# Patient Record
Sex: FEMALE | Race: WHITE | NOT HISPANIC OR LATINO | Employment: PART TIME | URBAN - METROPOLITAN AREA
[De-identification: names, ages, dates, MRNs, and addresses within clinical notes are randomized per-mention and may not be internally consistent; named-entity substitution may affect disease eponyms.]

---

## 2017-02-28 ENCOUNTER — HOSPITAL ENCOUNTER (EMERGENCY)
Facility: HOSPITAL | Age: 32
Discharge: HOME | End: 2017-02-28
Attending: EMERGENCY MEDICINE
Payer: COMMERCIAL

## 2017-02-28 VITALS
OXYGEN SATURATION: 100 % | RESPIRATION RATE: 16 BRPM | HEART RATE: 82 BPM | DIASTOLIC BLOOD PRESSURE: 101 MMHG | SYSTOLIC BLOOD PRESSURE: 159 MMHG | TEMPERATURE: 98.3 F | WEIGHT: 215 LBS

## 2017-02-28 DIAGNOSIS — Z51.89 VISIT FOR WOUND CHECK: Primary | ICD-10-CM

## 2017-02-28 DIAGNOSIS — T14.8XXA BLEEDING FROM WOUND: ICD-10-CM

## 2017-02-28 LAB
ANION GAP SERPL CALCULATED.3IONS-SCNC: 9 MMOL/L (ref 4–13)
BASOPHILS # BLD AUTO: 0 THOUSANDS/ΜL (ref 0–0.1)
BASOPHILS NFR BLD AUTO: 1 % (ref 0–1)
BUN SERPL-MCNC: 10 MG/DL (ref 5–25)
CALCIUM SERPL-MCNC: 8.8 MG/DL (ref 8.3–10.1)
CHLORIDE SERPL-SCNC: 105 MMOL/L (ref 100–108)
CO2 SERPL-SCNC: 27 MMOL/L (ref 21–32)
CREAT SERPL-MCNC: 0.85 MG/DL (ref 0.6–1.3)
EOSINOPHIL # BLD AUTO: 0.1 THOUSAND/ΜL (ref 0–0.61)
EOSINOPHIL NFR BLD AUTO: 1 % (ref 0–6)
ERYTHROCYTE [DISTWIDTH] IN BLOOD BY AUTOMATED COUNT: 16.3 % (ref 11.6–15.1)
GFR SERPL CREATININE-BSD FRML MDRD: >60 ML/MIN/1.73SQ M
GLUCOSE SERPL-MCNC: 90 MG/DL (ref 65–140)
HCT VFR BLD AUTO: 36.9 % (ref 37–47)
HGB BLD-MCNC: 12 G/DL (ref 12–16)
LYMPHOCYTES # BLD AUTO: 1.3 THOUSANDS/ΜL (ref 0.6–4.47)
LYMPHOCYTES NFR BLD AUTO: 20 % (ref 14–44)
MCH RBC QN AUTO: 26.8 PG (ref 27–31)
MCHC RBC AUTO-ENTMCNC: 32.5 G/DL (ref 31.4–37.4)
MCV RBC AUTO: 82 FL (ref 82–98)
MONOCYTES # BLD AUTO: 0.5 THOUSAND/ΜL (ref 0.17–1.22)
MONOCYTES NFR BLD AUTO: 8 % (ref 4–12)
NEUTROPHILS # BLD AUTO: 4.6 THOUSANDS/ΜL (ref 1.85–7.62)
NEUTS SEG NFR BLD AUTO: 70 % (ref 43–75)
NRBC BLD AUTO-RTO: 0 /100 WBCS
PLATELET # BLD AUTO: 448 THOUSANDS/UL (ref 130–400)
PMV BLD AUTO: 7.9 FL (ref 8.9–12.7)
POTASSIUM SERPL-SCNC: 4 MMOL/L (ref 3.5–5.3)
RBC # BLD AUTO: 4.48 MILLION/UL (ref 4.2–5.4)
SODIUM SERPL-SCNC: 141 MMOL/L (ref 136–145)
WBC # BLD AUTO: 6.5 THOUSAND/UL (ref 4.8–10.8)

## 2017-02-28 PROCEDURE — 85025 COMPLETE CBC W/AUTO DIFF WBC: CPT | Performed by: EMERGENCY MEDICINE

## 2017-02-28 PROCEDURE — 99283 EMERGENCY DEPT VISIT LOW MDM: CPT

## 2017-02-28 PROCEDURE — 80048 BASIC METABOLIC PNL TOTAL CA: CPT | Performed by: EMERGENCY MEDICINE

## 2017-02-28 PROCEDURE — 36415 COLL VENOUS BLD VENIPUNCTURE: CPT | Performed by: EMERGENCY MEDICINE

## 2017-02-28 RX ORDER — NIFEDIPINE 90 MG/1
90 TABLET, FILM COATED, EXTENDED RELEASE ORAL 2 TIMES DAILY
COMMUNITY
End: 2017-11-24 | Stop reason: ALTCHOICE

## 2017-02-28 RX ORDER — LABETALOL 200 MG/1
200 TABLET, FILM COATED ORAL 2 TIMES DAILY
COMMUNITY
End: 2017-11-24 | Stop reason: ALTCHOICE

## 2017-02-28 RX ORDER — LAMOTRIGINE 100 MG/1
300 TABLET ORAL
COMMUNITY
End: 2021-03-18 | Stop reason: ALTCHOICE

## 2017-02-28 RX ORDER — FOLIC ACID 1 MG/1
2 TABLET ORAL 2 TIMES DAILY
COMMUNITY
End: 2021-03-18 | Stop reason: ALTCHOICE

## 2017-11-24 ENCOUNTER — APPOINTMENT (EMERGENCY)
Dept: RADIOLOGY | Facility: HOSPITAL | Age: 32
End: 2017-11-24
Payer: COMMERCIAL

## 2017-11-24 ENCOUNTER — HOSPITAL ENCOUNTER (EMERGENCY)
Facility: HOSPITAL | Age: 32
Discharge: HOME | End: 2017-11-24
Admitting: EMERGENCY MEDICINE
Payer: COMMERCIAL

## 2017-11-24 VITALS
BODY MASS INDEX: 38.32 KG/M2 | TEMPERATURE: 97.1 F | DIASTOLIC BLOOD PRESSURE: 93 MMHG | RESPIRATION RATE: 16 BRPM | OXYGEN SATURATION: 99 % | HEART RATE: 91 BPM | SYSTOLIC BLOOD PRESSURE: 141 MMHG | WEIGHT: 230 LBS | HEIGHT: 65 IN

## 2017-11-24 DIAGNOSIS — S93.602A SPRAIN OF LEFT FOOT, INITIAL ENCOUNTER: Primary | ICD-10-CM

## 2017-11-24 PROCEDURE — 99283 EMERGENCY DEPT VISIT LOW MDM: CPT

## 2017-11-24 PROCEDURE — 73630 X-RAY EXAM OF FOOT: CPT

## 2017-11-24 RX ORDER — NAPROXEN 500 MG/1
500 TABLET ORAL 2 TIMES DAILY WITH MEALS
Qty: 20 TABLET | Refills: 0 | Status: SHIPPED | OUTPATIENT
Start: 2017-11-24 | End: 2019-03-08

## 2017-11-24 RX ORDER — LAMOTRIGINE 200 MG/1
200 TABLET ORAL
COMMUNITY
End: 2021-03-18 | Stop reason: ALTCHOICE

## 2017-11-24 NOTE — ED PROVIDER NOTES
History  Chief Complaint   Patient presents with    Foot Pain     Pt sts fell down steps yesterday and injured left foot  Pain near 5th MTP     33 y/o female presenting with left foot pain and swelling since last night that began after she had fallen down some stairs landing onto her left side  Did not hit her head nor lose consciousness  Was able to ambulate however with some pain  Has not been taking any pain medications  Pain ranking 7 as 10 nonradiating  Notes some swelling bruising to the lateral aspect of her foot  No other joint pain  Denies numbness, paresthesias, open injury  Prior to Admission Medications   Prescriptions Last Dose Informant Patient Reported? Taking?   folic acid (FOLVITE) 1 mg tablet   Yes No   Sig: Take 2 mg by mouth 2 (two) times a day   lamoTRIgine (LaMICtal) 100 mg tablet   Yes No   Sig: Take 300 mg by mouth daily in the early morning     lamoTRIgine (LaMICtal) 200 MG tablet   Yes Yes   Sig: Take 25 mg by mouth daily at bedtime      Facility-Administered Medications: None       Past Medical History:   Diagnosis Date    Hypertension     Seizures ([facility] [address] )        Past Surgical History:   Procedure Laterality Date    ABDOMINAL SURGERY      GASTRIC BYPASS         History reviewed  No pertinent family history  I have reviewed and agree with the history as documented  Social History   Substance Use Topics    Smoking status: Never Smoker    Smokeless tobacco: Never Used    Alcohol use Yes      Comment: occasional        Review of Systems   Constitutional: Negative  Negative for activity change and appetite change  HENT: Negative  Eyes: Negative  Respiratory: Negative  Cardiovascular: Negative  Gastrointestinal: Negative  Genitourinary: Negative  Musculoskeletal: Positive for arthralgias and joint swelling  Negative for back pain, gait problem, myalgias, neck pain and neck stiffness  Skin: Positive for color change   Negative for pallor, rash and wound    Neurological: Negative  Negative for tremors, weakness and numbness  All other systems reviewed and are negative  Physical Exam  ED Triage Vitals [11/24/17 0958]   Temperature Pulse Respirations Blood Pressure SpO2   (!) 97 1 °F (36 2 °C) 91 16 141/93 99 %      Temp src Heart Rate Source Patient Position - Orthostatic VS BP Location FiO2 (%)   -- -- -- -- --      Pain Score       3           Orthostatic Vital Signs  Vitals:    11/24/17 0958   BP: 141/93   Pulse: 91       Physical Exam   Constitutional: She is oriented to person, place, and time  She appears well-developed and well-nourished  Cardiovascular: Normal rate, regular rhythm, normal heart sounds and intact distal pulses  Pulmonary/Chest: Effort normal and breath sounds normal    S PO2 is 99% indicating adequate oxygenation  Musculoskeletal:        Feet:    Neurological: She is alert and oriented to person, place, and time  Skin: Skin is warm and dry  Capillary refill takes less than 2 seconds  Nursing note and vitals reviewed  ED Medications  Medications - No data to display    Diagnostic Studies  Results Reviewed     None                 XR foot 3+ views LEFT   Final Result by Sulma Carcamo MD (11/24 1049)      No acute osseous abnormality  Soft tissue swelling  Workstation performed: [id number]                    Procedures  Procedures       Phone Contacts  ED Phone Contact    ED Course  ED Course                                MDM  Number of Diagnoses or Management Options  Sprain of left foot, initial encounter:   Diagnosis management comments: Discussed with patient results of the x-rays which not show any acute osseous abnormality  Patient was placed in an Ace wrap assessed by me with good neurovascular exam before and after  She has crutches at and I encouraged her to use these  Will have her follow up with Ortho should symptoms persist return for worsening    Patient verbalizes understanding and agrees with the above assessment and plan  Amount and/or Complexity of Data Reviewed  Tests in the radiology section of CPT®: reviewed and ordered  Review and summarize past medical records: yes  Independent visualization of images, tracings, or specimens: yes      CritCare Time    Disposition  Final diagnoses:   Sprain of left foot, initial encounter     Time reflects when diagnosis was documented in both MDM as applicable and the Disposition within this note     Time User Action Codes Description Comment    11/24/2017 11:12 AM Dhruv Cervantes Add [D63 526H] Sprain of left foot, initial encounter       ED Disposition     ED Disposition Condition Comment    Discharge  Hector Bland discharge to home/self care  Condition at discharge: Good        Follow-up Information     Follow up With Specialties Details Why Contact Info Additional [address] Emergency Department Emergency Medicine Go to If symptoms worsen [address] [facility]  [phone] [facility] ED, [facility] [place], [place], [address] , DO Orthopedic Surgery Schedule an appointment as soon as possible for a visit As needed if symptoms persist  [address] [place] [address]  [phone]           Patient's Medications   Discharge Prescriptions    NAPROXEN (NAPROSYN) 500 MG TABLET    Take 1 tablet by mouth 2 (two) times a day with meals for 10 days       Start Date: 11/24/2017End Date: 12/4/2017       Order Dose: 500 mg       Quantity: 20 tablet    Refills: 0     No discharge procedures on file      ED Provider  Electronically Signed by           Susie Aguilar PA-C  11/24/17 9759

## 2017-11-24 NOTE — DISCHARGE INSTRUCTIONS
Foot Sprain   WHAT YOU NEED TO KNOW:   A foot sprain is caused by a stretched or torn ligament in the foot or toe  Ligaments are tough tissues that connect bones  DISCHARGE INSTRUCTIONS:   Seek care immediately if:   · You have numbness or tingling below the injury, such as in your toes  · The skin on your injured foot is blue or pale  · You have increased pain, even after you take pain medicine  Contact your healthcare provider if:   · You have new weakness in your foot  · You have new or increased swelling in your foot  · You have new or increased stiffness when you move your injured foot  · You have questions or concerns about your condition or care  Medicines:   · NSAIDs , such as ibuprofen, help decrease swelling, pain, and fever  This medicine is available with or without a doctor's order  NSAIDs can cause stomach bleeding or kidney problems in certain people  If you take blood thinner medicine, always ask if NSAIDs are safe for you  Always read the medicine label and follow directions  Do not give these medicines to children under 10months of age without direction from your child's healthcare provider  · Take your medicine as directed  Contact your healthcare provider if you think your medicine is not helping or if you have side effects  Tell him of her if you are allergic to any medicine  Keep a list of the medicines, vitamins, and herbs you take  Include the amounts, and when and why you take them  Bring the list or the pill bottles to follow-up visits  Carry your medicine list with you in case of an emergency  Self-care:   · Rest your foot  Limit movement in your sprained foot for the first 2 to 3 days  You might need crutches to take weight off your injured foot as it heals  Use crutches as directed  · Apply ice  on your foot for 15 to 20 minutes every hour or as directed  Use an ice pack, or put crushed ice in a plastic bag  Cover it with a towel   Ice helps prevent tissue damage and decreases swelling and pain  · Compress your foot  You may need to use tape or an elastic bandage to support your foot if you have a mild sprain  You may need a splint on your foot for support if your sprain is severe  Wear your splint for as many days as directed  · Elevate your foot  above the level of your heart as often as you can  This will help decrease swelling and pain  Prop your foot on pillows or blankets to keep it elevated comfortably  Exercise your foot:  You may be given exercises to improve your strength and to help decrease stiffness  The exercises and physical therapy can help restore strength and increase the range of motion in your foot  Ask your healthcare provider when you can return to your normal activities or play sports  Prevent another foot sprain:   · Warm up and stretch before you exercise  · Do not exercise when you feel pain or are tired  · Wear equipment to protect yourself when you play sports  Follow up with your healthcare provider as directed:  Write down your questions so you remember to ask them during your visits  © 2017 [facility] Information is for End User's use only and may not be sold, redistributed or otherwise used for commercial purposes  All illustrations and images included in CareNotes® are the copyrighted property of A D A M , Inc  or [employer]  The above information is an  only  It is not intended as medical advice for individual conditions or treatments  Talk to your doctor, nurse or pharmacist before following any medical regimen to see if it is safe and effective for you

## 2019-03-08 ENCOUNTER — APPOINTMENT (EMERGENCY)
Dept: RADIOLOGY | Facility: HOSPITAL | Age: 34
End: 2019-03-08
Payer: COMMERCIAL

## 2019-03-08 ENCOUNTER — HOSPITAL ENCOUNTER (EMERGENCY)
Facility: HOSPITAL | Age: 34
Discharge: HOME | End: 2019-03-08
Attending: EMERGENCY MEDICINE | Admitting: EMERGENCY MEDICINE
Payer: COMMERCIAL

## 2019-03-08 VITALS
RESPIRATION RATE: 18 BRPM | WEIGHT: 245 LBS | BODY MASS INDEX: 40.82 KG/M2 | DIASTOLIC BLOOD PRESSURE: 80 MMHG | HEIGHT: 65 IN | TEMPERATURE: 97.2 F | HEART RATE: 90 BPM | SYSTOLIC BLOOD PRESSURE: 135 MMHG | OXYGEN SATURATION: 99 %

## 2019-03-08 DIAGNOSIS — S00.33XA CONTUSION OF NOSE, INITIAL ENCOUNTER: Primary | ICD-10-CM

## 2019-03-08 PROCEDURE — 99284 EMERGENCY DEPT VISIT MOD MDM: CPT

## 2019-03-08 PROCEDURE — 70486 CT MAXILLOFACIAL W/O DYE: CPT

## 2019-03-08 RX ORDER — ACETAMINOPHEN 325 MG/1
650 TABLET ORAL ONCE
Status: COMPLETED | OUTPATIENT
Start: 2019-03-08 | End: 2019-03-08

## 2019-03-08 RX ADMIN — ACETAMINOPHEN 650 MG: 325 TABLET, FILM COATED ORAL at 20:41

## 2019-03-09 NOTE — ED PROVIDER NOTES
History  Chief Complaint   Patient presents with    Nasal Injury     Pt reported that her 2 yr boy bumped her nose with his head  Pt heard "snap" from her nose, it was bleeding a liitle at home  Denies any LOC  co headche after the impact  29-year-old female presents to the ER for evaluation of nose injury  About an hour prior to arrival patient's 3year-old softball head butted her nose and she has some mild bleeding afterwards  Patient felt a popping sensation upon impact  Patient currently has moderate pain to bilateral nares  Bleeding stopped  Patient came for evaluation of nasal bone fracture  Patient did not take anything for pain  History provided by:  Parent      Prior to Admission Medications   Prescriptions Last Dose Informant Patient Reported? Taking?   folic acid (FOLVITE) 1 mg tablet Not Taking at Unknown time  Yes No   Sig: Take 2 mg by mouth 2 (two) times a day   lamoTRIgine (LaMICtal) 100 mg tablet 3/8/2019 at Unknown time  Yes Yes   Sig: Take 300 mg by mouth daily in the early morning     lamoTRIgine (LaMICtal) 200 MG tablet  Self Yes No   Sig: Take 200 mg by mouth daily at bedtime       Facility-Administered Medications: None       Past Medical History:   Diagnosis Date    Seizures ([facility] [address] )        Past Surgical History:   Procedure Laterality Date    ABDOMINAL SURGERY      GASTRIC BYPASS         No family history on file  I have reviewed and agree with the history as documented  Social History     Tobacco Use    Smoking status: Never Smoker    Smokeless tobacco: Never Used   Substance Use Topics    Alcohol use: Not Currently    Drug use: No        Review of Systems   Constitutional: Negative for activity change, appetite change, chills and fever  HENT: Negative for congestion and ear pain  Nose pain   Eyes: Negative for pain and discharge  Respiratory: Negative for cough, chest tightness, shortness of breath, wheezing and stridor      Cardiovascular: Negative for chest pain and palpitations  Gastrointestinal: Negative for abdominal distention, abdominal pain, constipation, diarrhea and nausea  Endocrine: Negative for cold intolerance  Genitourinary: Negative for dysuria, frequency and urgency  Musculoskeletal: Negative for arthralgias and back pain  Skin: Negative for color change and rash  Allergic/Immunologic: Negative for environmental allergies and food allergies  Neurological: Negative for dizziness, weakness, numbness and headaches  Hematological: Negative for adenopathy  Psychiatric/Behavioral: Negative for agitation, behavioral problems and confusion  The patient is not nervous/anxious  All other systems reviewed and are negative  Physical Exam  Physical Exam   Constitutional: She is oriented to person, place, and time  She appears well-developed and well-nourished  HENT:   Head: Normocephalic and atraumatic  Mouth/Throat: Oropharynx is clear and moist    Tenderness to palpation noted over nasal bridge  No other obvious deformities noted  No blood noted to bilateral nasal canals  No septal deviation noted  Eyes: Conjunctivae and EOM are normal    Neck: Normal range of motion  Neck supple  Cardiovascular: Normal rate, regular rhythm, normal heart sounds and intact distal pulses  Pulmonary/Chest: Effort normal and breath sounds normal    Abdominal: Soft  Bowel sounds are normal  She exhibits no distension  There is no tenderness  Musculoskeletal: Normal range of motion  Neurological: She is alert and oriented to person, place, and time  Skin: Skin is warm and dry  Psychiatric: She has a normal mood and affect  Her behavior is normal  Judgment and thought content normal    Nursing note and vitals reviewed        Vital Signs  ED Triage Vitals [03/08/19 1954]   Temperature Pulse Respirations Blood Pressure SpO2   (!) 97 2 °F (36 2 °C) 90 18 135/80 99 %      Temp Source Heart Rate Source Patient Position - Orthostatic VS BP Location FiO2 (%)   Tympanic Monitor Sitting Right arm --      Pain Score       5           Vitals:    03/08/19 1954   BP: 135/80   Pulse: 90   Patient Position - Orthostatic VS: Sitting       Visual Acuity      ED Medications  Medications   acetaminophen (TYLENOL) tablet 650 mg (650 mg Oral Given 3/8/19 2041)       Diagnostic Studies  Results Reviewed     None                 CT facial bones without contrast   Final Result by Becca Martinez DO (03/08 2108)      No evidence of acute traumatic injury to the facial bones  Workstation performed: [id number]                    Procedures  Procedures       Phone Contacts  ED Phone Contact    ED Course                               MDM  Number of Diagnoses or Management Options  Contusion of nose, initial encounter: new and requires workup  Diagnosis management comments: Obtain CT facial bone to rule out any acute traumatic injuries  Give Tylenol for pain  Risk of Complications, Morbidity, and/or Mortality  General comments: No acute traumatic injuries noted on CT scan  At this point patient's symptoms are consistent with nasal contusion  Discussed supportive care and p r n  Tylenol for pain  Patient discharged home with follow-up to PCP  Close return instructions given to return to the ER for any worsening symptoms  Patient agrees with discharge plan  Patient well appearing at time of discharge  Patient Progress  Patient progress: stable      Disposition  Final diagnoses:   Contusion of nose, initial encounter     Time reflects when diagnosis was documented in both MDM as applicable and the Disposition within this note     Time User Action Codes Description Comment    3/8/2019  9:47 PM Irean Frankel Add [S00 [id number]] Contusion of nose, initial encounter       ED Disposition     ED Disposition Condition Date/Time Comment    Discharge Stable Fri Mar 8, 2019  9:47 PM Adrian Weber discharge to home/self care              Follow-up Information Follow up With Specialties Details Why Contact Info    Ivan Wilson DO   As needed [address]  [address]  [phone]            Discharge Medication List as of 3/8/2019  9:48 PM      CONTINUE these medications which have NOT CHANGED    Details   !! lamoTRIgine (LaMICtal) 100 mg tablet Take 300 mg by mouth daily in the early morning  , Historical Med      folic acid (FOLVITE) 1 mg tablet Take 2 mg by mouth 2 (two) times a day, Until Discontinued, Historical Med      !! lamoTRIgine (LaMICtal) 200 MG tablet Take 200 mg by mouth daily at bedtime , Historical Med       !! - Potential duplicate medications found  Please discuss with provider  No discharge procedures on file      ED Provider  Electronically Signed by           Sterling Parra DO  03/09/19 0110

## 2020-02-29 ENCOUNTER — OFFICE VISIT (OUTPATIENT)
Dept: URGENT CARE | Facility: CLINIC | Age: 35
End: 2020-02-29
Payer: COMMERCIAL

## 2020-02-29 ENCOUNTER — APPOINTMENT (OUTPATIENT)
Dept: RADIOLOGY | Facility: CLINIC | Age: 35
End: 2020-02-29
Payer: COMMERCIAL

## 2020-02-29 VITALS
DIASTOLIC BLOOD PRESSURE: 82 MMHG | WEIGHT: 250 LBS | TEMPERATURE: 97.6 F | HEART RATE: 87 BPM | SYSTOLIC BLOOD PRESSURE: 143 MMHG | BODY MASS INDEX: 41.65 KG/M2 | OXYGEN SATURATION: 100 % | HEIGHT: 65 IN | RESPIRATION RATE: 16 BRPM

## 2020-02-29 DIAGNOSIS — M79.671 RIGHT FOOT PAIN: Primary | ICD-10-CM

## 2020-02-29 PROCEDURE — 73630 X-RAY EXAM OF FOOT: CPT

## 2020-02-29 PROCEDURE — 99213 OFFICE O/P EST LOW 20 MIN: CPT | Performed by: PHYSICIAN ASSISTANT

## 2020-02-29 RX ORDER — LEVOTHYROXINE SODIUM 0.05 MG/1
50 TABLET ORAL DAILY
COMMUNITY
Start: 2020-01-26 | End: 2021-03-18 | Stop reason: ALTCHOICE

## 2020-02-29 RX ORDER — PNV,CALCIUM 72/IRON/FOLIC ACID 27 MG-1 MG
1 TABLET ORAL DAILY
COMMUNITY
Start: 2020-02-17 | End: 2021-03-18 | Stop reason: ALTCHOICE

## 2020-02-29 NOTE — PROGRESS NOTES
[facility] [facility]        NAME: Mirtha Love is a 29 y o  female  : 1985    MRN: [id number]  DATE: 2020  TIME: 12:20 PM    Assessment and Plan   Right foot pain [M79 671]  1  Right foot pain  XR foot 3+ vw right         Patient Instructions   The patient is currently in her 2nd trimester pregnancy  Risks and benefits of performing an x-ray were extensively discussed with the patient  She is agreeable to an x-ray and understands the above  X-ray right foot performed in office-no fractures visualized  Official radiology report pending at this time  Our office will call if there are positive findings  Needed pain  Rest ice elevate  Follow up with an orthopedic physician if pain persists  Proceed to  ER if symptoms worsen  Chief Complaint     Chief Complaint   Patient presents with    Foot Pain     Right foot pain due to injury occurred approx 3 weeks ago         History of Present Illness   The patient is a 60-year-old female right pain has been approximately 3 weeks ago  She is currently in her 2nd trimester pregnancy  She states that she dropped a cellphone  weighing approximately 5 lb on right foot  She has had positive tenderness above the right 3rd toe with no improvement  She has been taking Tylenol pain  There is no bruising or swelling  Negative wound or abrasion  HPI    Review of Systems   Review of Systems   Constitutional: Negative for fever  Musculoskeletal:        Right foot pain  All other systems reviewed and are negative          Current Medications       Current Outpatient Medications:     folic acid (FOLVITE) 1 mg tablet, Take 2 mg by mouth 2 (two) times a day, Disp: , Rfl:     lamoTRIgine (LaMICtal) 100 mg tablet, Take 300 mg by mouth daily in the early morning  , Disp: , Rfl:     lamoTRIgine (LaMICtal) 200 MG tablet, Take 200 mg by mouth daily at bedtime , Disp: , Rfl:     levothyroxine 50 mcg tablet, Take 50 mcg by mouth daily, Disp: , Rfl:     Prenatal Vit-Fe Fumarate-FA (PREPLUS) 27-1 MG TABS, Take 1 tablet by mouth daily, Disp: , Rfl:     Current Allergies     Allergies as of 02/29/2020    (No Known Allergies)            The following portions of the patient's history were reviewed and updated as appropriate: allergies, current medications, past family history, past medical history, past social history, past surgical history and problem list      Past Medical History:   Diagnosis Date    Seizures ([facility] [address] )        Past Surgical History:   Procedure Laterality Date    ABDOMINAL SURGERY      GASTRIC BYPASS         History reviewed  No pertinent family history  Medications have been verified  Objective   /82   Pulse 87   Temp 97 6 °F (36 4 °C)   Resp 16   Ht 5' 5" (1 651 m)   Wt 113 kg (250 lb)   LMP 03/01/2019   SpO2 100%   BMI 41 60 kg/m²        Physical Exam     Physical Exam   Constitutional: She appears well-developed and well-nourished  No distress  HENT:   Head: Normocephalic and atraumatic  Pulmonary/Chest: Effort normal  No respiratory distress  Musculoskeletal: Normal range of motion  She exhibits no edema or deformity  Right foot: There is tenderness and bony tenderness  There is normal range of motion, no swelling, normal capillary refill, no crepitus, no deformity and no laceration  Feet:    Skin: Skin is warm and dry  Capillary refill takes less than 2 seconds  No rash noted  She is not diaphoretic  No erythema  No pallor  Psychiatric: She has a normal mood and affect  Her behavior is normal    Nursing note and vitals reviewed

## 2020-02-29 NOTE — PATIENT INSTRUCTIONS
The patient is currently in her 2nd trimester pregnancy  Risks and benefits of performing an x-ray were extensively discussed with the patient  She is agreeable to an x-ray and understands the above  X-ray right foot performed in office-no fractures visualized  Official radiology report pending at this time  Our office will call if there are positive findings  Needed pain  Rest ice elevate  Follow up with an orthopedic physician if pain persists  Proceed to  ER if symptoms worsen

## 2020-05-12 LAB
EXTERNAL HIV CONFIRMATION: NORMAL
EXTERNAL HIV SCREEN: NORMAL

## 2021-03-10 DIAGNOSIS — Z23 ENCOUNTER FOR IMMUNIZATION: ICD-10-CM

## 2021-03-18 ENCOUNTER — OFFICE VISIT (OUTPATIENT)
Dept: URGENT CARE | Facility: CLINIC | Age: 36
End: 2021-03-18
Payer: COMMERCIAL

## 2021-03-18 VITALS
RESPIRATION RATE: 16 BRPM | BODY MASS INDEX: 43.27 KG/M2 | HEART RATE: 100 BPM | OXYGEN SATURATION: 98 % | TEMPERATURE: 96.5 F | WEIGHT: 260 LBS

## 2021-03-18 DIAGNOSIS — S46.811A STRAIN OF RIGHT TRAPEZIUS MUSCLE, INITIAL ENCOUNTER: Primary | ICD-10-CM

## 2021-03-18 PROCEDURE — 99213 OFFICE O/P EST LOW 20 MIN: CPT | Performed by: PHYSICIAN ASSISTANT

## 2021-03-18 RX ORDER — LAMOTRIGINE 100 MG/1
100 TABLET ORAL DAILY
COMMUNITY

## 2021-03-18 RX ORDER — LANOLIN ALCOHOL/MO/W.PET/CERES
1 CREAM (GRAM) TOPICAL
COMMUNITY

## 2021-03-18 RX ORDER — METAXALONE 800 MG/1
800 TABLET ORAL 3 TIMES DAILY
Qty: 20 TABLET | Refills: 0 | Status: SHIPPED | OUTPATIENT
Start: 2021-03-18

## 2021-03-18 NOTE — PATIENT INSTRUCTIONS
Take the muscle relaxant as directed  This medication can make you drowsy  Continue ibuprofen to reduce pain and inflammation  Apply heat for 20-30 minutes at a time, as needed for swelling and discomfort  Perform stretching and range-of-motion exercises 2-3 times daily after heat application  Begin physical therapy  Follow up with family doctor in 3-5 days  Proceed to the ER if symptoms worsen

## 2021-03-18 NOTE — PROGRESS NOTES
[facility]        NAME: Ekaterina Lazo is a 28 y o  female  : 1985    MRN: [id number]  DATE: 2021  TIME: 10:14 AM    Assessment and Plan   Strain of right trapezius muscle, initial encounter [S49 446B]  1  Strain of right trapezius muscle, initial encounter  metaxalone (SKELAXIN) 800 mg tablet    Ambulatory referral to Physical Therapy     Patient Instructions     Take the muscle relaxant as directed  This medication can make you drowsy  Continue ibuprofen to reduce pain and inflammation  Apply heat for 20-30 minutes at a time, as needed for swelling and discomfort  Perform stretching and range-of-motion exercises 2-3 times daily after heat application  Begin physical therapy  Follow up with family doctor in 3-5 days  Proceed to the ER if symptoms worsen  Chief Complaint     Chief Complaint   Patient presents with    Pain     right shoulder pain r/t child playing with pt and jumping     History of Present Illness    77-year-old female presenting with complaint of right shoulder pain  Pain has been ongoing for the past week or 2  Cannot recall a specific injury but states that she often sits on the floor to play with her toddler  Notes he often jumps and pulls at her and believes that this may have caused the pain  Reports a tightness and sharp pain over the posterior shoulder that is made worse by movement  Denies any distal numbness, tingling, or weakness  No prior injury  No neck or chest pain  She is treating with ibuprofen with some relief  Review of Systems   Review of Systems   Constitutional: Negative for chills, diaphoresis and fever  Respiratory: Negative for cough, shortness of breath and wheezing  Cardiovascular: Negative for chest pain and palpitations  Musculoskeletal:        As noted in HPI  Skin: Negative for color change  Neurological: Negative for dizziness and headaches       Current Medications       Current Outpatient Medications:    folic acid (FOLVITE) 225 mcg tablet, Take 1 mg by mouth, Disp: , Rfl:     lamoTRIgine (LaMICtal) 100 mg tablet, Take 100 mg by mouth daily 500 mg total/ day, Disp: , Rfl:     metaxalone (SKELAXIN) 800 mg tablet, Take 1 tablet (800 mg total) by mouth 3 (three) times a day, Disp: 20 tablet, Rfl: 0    Current Allergies     Allergies as of 2021    (No Known Allergies)            The following portions of the patient's history were reviewed and updated as appropriate: allergies, current medications, past family history, past medical history, past social history, past surgical history and problem list      Past Medical History:   Diagnosis Date    Seizures ([facility], [place] [address] )        Past Surgical History:   Procedure Laterality Date    ABDOMINAL SURGERY       SECTION      x1    GASTRIC BYPASS         History reviewed  No pertinent family history  Medications have been verified  Objective   Pulse 100   Temp (!) 96 5 °F (35 8 °C)   Resp 16   Wt 118 kg (260 lb)   LMP 2021   SpO2 98%   Breastfeeding No   BMI 43 27 kg/m²   Patient's last menstrual period was 2021  Physical Exam     Physical Exam  Vitals signs and nursing note reviewed  Constitutional:       General: She is not in acute distress  Appearance: She is well-developed  She is not ill-appearing or diaphoretic  HENT:      Head: Normocephalic and atraumatic  Eyes:      General: Lids are normal       Conjunctiva/sclera: Conjunctivae normal    Cardiovascular:      Rate and Rhythm: Normal rate and regular rhythm  Pulmonary:      Effort: Pulmonary effort is normal       Breath sounds: Normal breath sounds  No decreased breath sounds, wheezing, rhonchi or rales  Musculoskeletal:      Right shoulder: She exhibits decreased range of motion, tenderness (over right trapezius) and spasm (right trapezius)  She exhibits no bony tenderness, no swelling, no effusion, no crepitus, no deformity and normal strength  Cervical back: Normal    Skin:     General: Skin is warm and dry  Neurological:      General: No focal deficit present  Mental Status: She is alert  She is not disoriented  Cranial Nerves: Cranial nerves are intact  Coordination: Coordination normal       Gait: Gait normal    Psychiatric:         Behavior: Behavior normal  Behavior is cooperative  Thought Content:  Thought content normal          Judgment: Judgment normal

## 2022-01-24 ENCOUNTER — AMB VIDEO VISIT (OUTPATIENT)
Dept: OTHER | Facility: HOSPITAL | Age: 37
End: 2022-01-24

## 2022-01-24 DIAGNOSIS — U07.1 COVID: Primary | ICD-10-CM

## 2022-01-24 DIAGNOSIS — R42 DIZZINESS: ICD-10-CM

## 2022-01-24 PROCEDURE — ECARE PR SL URGENT CARE VIRTUAL VISIT: Performed by: NURSE PRACTITIONER

## 2022-01-24 NOTE — PATIENT INSTRUCTIONS
At this time, would recommend you monitor symptoms  Rest and hydrate yourself  Gatorade and sports drinks may be helpful  No sudden movements  Offered ER but you declined, as we dicussed if symptoms return or worsen go to ER  Continue to isolate as per CDC guidelines for covid  Go to ER with any worsening symptoms, chest pain, sob, persistent dizziness, dehydration or confusion  In Your Home:  If you live with other people, trying to avoid common spaces and disinfect areas that you come into contact with  Per the CDC's recommendations: persons who suspect that they might have COVID-19 should isolate, stay at home, and use a separate bedroom and bathroom if feasible  Isolation should begin even before seeking testing and before test results become available  All household members should start wearing a mask in the home, particularly in shared spaces where appropriate distancing is not possible  Take Care of Yourself:  Try to sleep on your stomach as much as possible  If you have the ability to, take vitamin D3 2000 IU by mouth daily, vitamin-C 1000 mg by mouth twice a day, a multivitamin daily to help boost your immune system  You should check your temperature twice day  Return to the emergency department for any severe shortness of breath or pulse ox less than 90%  If You Test Positive for COVID-19 (Isolate)     Everyone, regardless of vaccination status:     · Stay home for 5 days  · If you have no symptoms or your symptoms are resolving after 5 days, you can leave your house  · Continue to wear a mask around others for 5 additional days  If you have a fever, continue to stay home until your fever resolves       If You Were Exposed to Someone with COVID-19 (Quarantine)  If you:  Have been boosted  OR  Completed the primary series of Pfizer or Moderna vaccine within the last 6 months  OR  Completed the primary series of J&J vaccine within the last 2 months     · Wear a mask around others for 10 days  · Test on day 5, if possible  If you develop symptoms get a test and stay home  If you:  Completed the primary series of Pfizer or Moderna vaccine over 6 months ago and are not boosted  OR  Completed the primary series of J&J over 2 months ago and are not boosted  OR  Are unvaccinated     · Stay home for 5 days  After that continue to wear a mask around others for 5 additional days  · If you cant quarantine you must wear a mask for 10 days  · Test on day 5 if possible       If you develop symptoms get a test and stay home

## 2022-01-24 NOTE — CARE ANYWHERE EVISITS
Visit Summary for Johnathan Cherry - Gender: Female - Date of Birth: [id number]  Date: [id number] - Duration: 10 minutes  Patient: Johnathan Cherry  Provider: Sarah CISNEROS    Patient Contact Information  Address  [address]; [address]  [phone]    Visit Topics  disoriented/ double vision/covid [Added By: Self - 2022-01-24]    Triage Questions   What is your current physical address in the event of a medical emergency? Answer []  Are you allergic to any medications? Answer []  Are you now or could you be pregnant? Answer []  Do you have any immune system compromise or chronic lung   disease? Answer []  Do you have any vulnerable family members in the home (infant, pregnant, cancer, elderly)? Answer []     Conversation Transcripts  [0A][0A] [Notification] You are connected with Oscar Celaya, Urgent Care Specialist [0A][Notification] Ivana Anguiano is located in [place]  [0A][Notification] Ivana Anguiano has shared health history  Jareth Manifold  [0A]    Diagnosis  COVID-19  Dizziness and giddiness    Procedures  Value: [id number] Code: CPT-4 UNLISTED E&M SERVICE    Medications Prescribed    No prescriptions ordered    Electronically signed by: Oscar Duron(NPI [id number])

## 2022-01-24 NOTE — PROGRESS NOTES
Video Visit - Freddy Alonso 39 y o  female MRN: [id number]    REQUIRED DOCUMENTATION:         1  This service was [facility] via [facility]  2  Provider located at [address] [place] [id number]  3  [facility] provider: BLAYNE Lino  4  Identify all parties in room with patient during [facility] visit:  Patient, , kids   11  After connecting through [employer], patient was identified by name and date of birth  Patient was then informed that this was a Telemedicine visit and that the exam was being conducted confidentially over secure lines  My office door was closed  No one else was in the room  Patient acknowledged consent and understanding of privacy and security of the Telemedicine visit  I informed the patient that I have reviewed their record in Epic and presented the opportunity for them to ask any questions regarding the visit today  The patient agreed to participate  This is a 39year old female here today for video visit  She states this evening while making soup over the stove she became lightheaded, dizzy and disoriented  She states she had some double vision  This lasted about 30 seconds  She does not she did test positive for COVID 19 on Saturday  She is complaining of congestion  She has not body aches, fevers or chills  She denies any weakness in her arms or legs  No numbness or tingling  No slurred speech or facial droop  She states dizziness and lightheadedness has resolved  She still feels foggy  No chest pain or sob  No headaches  Physical Exam  Constitutional:       General: She is not in acute distress  Appearance: Normal appearance  She is not ill-appearing or toxic-appearing  Pulmonary:      Comments: No cough or audible wheezing on video visit  Neurological:      Mental Status: She is alert and oriented to person, place, and time  GCS: GCS eye subscore is 4  GCS verbal subscore is 5  GCS motor subscore is 6  Cranial Nerves: No cranial nerve deficit  Motor: No weakness  Coordination: Coordination normal  Rapid alternating movements normal       Gait: Gait normal    Psychiatric:         Mood and Affect: Mood normal          Behavior: Behavior normal          Thought Content: Thought content normal          Judgment: Judgment normal        Diagnoses and all orders for this visit:    COVID    Dizziness      Patient Instructions   At this time, would recommend you monitor symptoms  Rest and hydrate yourself  Gatorade and sports drinks may be helpful  No sudden movements  Offered ER but you declined, as we dicussed if symptoms return or worsen go to ER  Continue to isolate as per CDC guidelines for covid  Go to ER with any worsening symptoms, chest pain, sob, persistent dizziness, dehydration or confusion  In Your Home:  If you live with other people, trying to avoid common spaces and disinfect areas that you come into contact with  Per the CDC's recommendations: persons who suspect that they might have COVID-19 should isolate, stay at home, and use a separate bedroom and bathroom if feasible  Isolation should begin even before seeking testing and before test results become available  All household members should start wearing a mask in the home, particularly in shared spaces where appropriate distancing is not possible  Take Care of Yourself:  Try to sleep on your stomach as much as possible  If you have the ability to, take vitamin D3 2000 IU by mouth daily, vitamin-C 1000 mg by mouth twice a day, a multivitamin daily to help boost your immune system  You should check your temperature twice day  Return to the emergency department for any severe shortness of breath or pulse ox less than 90%  If You Test Positive for COVID-19 (Isolate)     Everyone, regardless of vaccination status:     · Stay home for 5 days    · If you have no symptoms or your symptoms are resolving after 5 days, you can leave your house  · Continue to wear a mask around others for 5 additional days  If you have a fever, continue to stay home until your fever resolves  If You Were Exposed to Someone with COVID-19 (Quarantine)  If you:  Have been boosted  OR  Completed the primary series of Pfizer or Moderna vaccine within the last 6 months  OR  Completed the primary series of J&J vaccine within the last 2 months     · Wear a mask around others for 10 days  · Test on day 5, if possible  If you develop symptoms get a test and stay home  If you:  Completed the primary series of Pfizer or Moderna vaccine over 6 months ago and are not boosted  OR  Completed the primary series of J&J over 2 months ago and are not boosted  OR  Are unvaccinated     · Stay home for 5 days  After that continue to wear a mask around others for 5 additional days  · If you cant quarantine you must wear a mask for 10 days  · Test on day 5 if possible  If you develop symptoms get a test and stay home           Follow up with PCP if not improved, if symptoms are worse, go to the ER

## 2022-04-04 ENCOUNTER — OFFICE VISIT (OUTPATIENT)
Dept: URGENT CARE | Facility: CLINIC | Age: 37
End: 2022-04-04
Payer: COMMERCIAL

## 2022-04-04 VITALS — OXYGEN SATURATION: 98 % | RESPIRATION RATE: 14 BRPM | HEART RATE: 92 BPM | TEMPERATURE: 97.9 F

## 2022-04-04 DIAGNOSIS — J20.8 ACUTE BACTERIAL BRONCHITIS: Primary | ICD-10-CM

## 2022-04-04 DIAGNOSIS — B96.89 ACUTE BACTERIAL BRONCHITIS: Primary | ICD-10-CM

## 2022-04-04 PROBLEM — Z98.891 PREVIOUS CESAREAN SECTION: Status: ACTIVE | Noted: 2020-07-27

## 2022-04-04 PROBLEM — S30.1XXA RECTUS SHEATH HEMATOMA: Status: ACTIVE | Noted: 2020-07-30

## 2022-04-04 PROCEDURE — 99213 OFFICE O/P EST LOW 20 MIN: CPT | Performed by: FAMILY MEDICINE

## 2022-04-04 RX ORDER — AZITHROMYCIN 250 MG/1
TABLET, FILM COATED ORAL
Qty: 6 TABLET | Refills: 0 | Status: SHIPPED | OUTPATIENT
Start: 2022-04-04 | End: 2022-04-08

## 2022-04-04 RX ORDER — BENZONATATE 200 MG/1
200 CAPSULE ORAL 3 TIMES DAILY PRN
Qty: 20 CAPSULE | Refills: 0 | Status: SHIPPED | OUTPATIENT
Start: 2022-04-04

## 2022-04-04 NOTE — PROGRESS NOTES
[facility]        NAME: Humza Hammond is a 39 y o  female  : 1985    MRN: [id number]  DATE: 2022  TIME: 1:21 PM    Assessment and Plan   Acute bacterial bronchitis [J20 8, B96 89]  1  Acute bacterial bronchitis  azithromycin (ZITHROMAX) 250 mg tablet    benzonatate (TESSALON) 200 MG capsule         Patient Instructions     Patient Instructions   1  Acute Bacterial Bronchitis  - Zithromax prescribed, to be completed as directed  - Tessalon pearls prescribed, to be taken as needed for cough   - rest and drink plenty of fluids  - take Tylenol or Motrin as needed   - run a humidifier at home   - follow up w/ PCP for re-check in 3-5 days   - if symptoms persist despite treatment, worsen, or any new symptoms present, patient is to be seen in the ER  Follow up with PCP in 3-5 days  Proceed to  ER if symptoms worsen  Chief Complaint     Chief Complaint   Patient presents with    Cold Like Symptoms     x 1 week, fever last night         History of Present Illness       40 yo female presents c/o nasal congestion, rhinorrhea, sore throat, and a dry cough  She has been ill x 1 week  She states the cough is the most bothersome symptom and she feels it is getting worse  She states she had a fever of 101-104 last night  No headache or body aches  No chest pain, SOB, or wheezing  Non-smoker  No GI sx  No skin rashes  No loss of taste or smell  No recent travel or known exposure to anyone with COVID-19  Patient has received the COVID-19 vaccine  She does not want to be tested for COVID-19 today  She has no known allergies  She denies any chance of pregnancy  She has been taking OTC cough and cold medications for the symptoms  Review of Systems   Review of Systems   Constitutional:        As noted in HPI   HENT:        As noted in HPI   Eyes: Negative  Respiratory:        As noted in HPI   Cardiovascular: Negative  Gastrointestinal: Negative  Musculoskeletal: Negative      Skin: Negative  Allergic/Immunologic: Negative  Neurological: Negative  Hematological: Negative  Current Medications       Current Outpatient Medications:     folic acid (FOLVITE) 098 mcg tablet, Take 1 mg by mouth, Disp: , Rfl:     lamoTRIgine (LaMICtal) 100 mg tablet, Take 100 mg by mouth daily 500 mg total/ day, Disp: , Rfl:     levonorgestrel (MIRENA) 20 MCG/24HR IUD, 1 each by Intrauterine route once, Disp: , Rfl:     azithromycin (ZITHROMAX) 250 mg tablet, Take 2 tablets today then 1 tablet daily x 4 days, Disp: 6 tablet, Rfl: 0    benzonatate (TESSALON) 200 MG capsule, Take 1 capsule (200 mg total) by mouth 3 (three) times a day as needed for cough, Disp: 20 capsule, Rfl: 0    metaxalone (SKELAXIN) 800 mg tablet, Take 1 tablet (800 mg total) by mouth 3 (three) times a day (Patient not taking: Reported on 2022 ), Disp: 20 tablet, Rfl: 0    Current Allergies     Allergies as of 2022    (No Known Allergies)            The following portions of the patient's history were reviewed and updated as appropriate: allergies, current medications, past family history, past medical history, past social history, past surgical history and problem list      Past Medical History:   Diagnosis Date    Seizures ([facility] [address] )        Past Surgical History:   Procedure Laterality Date    ABDOMINAL SURGERY       SECTION      x1    GASTRIC BYPASS         History reviewed  No pertinent family history  Medications have been verified  Objective   Pulse 92   Temp 97 9 °F (36 6 °C)   Resp 14   SpO2 98%   No LMP recorded  (Menstrual status: Birth Control)  Physical Exam     Physical Exam  Vitals and nursing note reviewed  Constitutional:       General: She is awake  She is not in acute distress  Appearance: Normal appearance  She is well-developed and well-groomed  She is not ill-appearing, toxic-appearing or diaphoretic  HENT:      Head: Normocephalic and atraumatic        Right Ear: Tympanic membrane, ear canal and external ear normal       Left Ear: Tympanic membrane, ear canal and external ear normal       Nose: Nose normal       Mouth/Throat:      Lips: Pink  Mouth: Mucous membranes are moist       Pharynx: Oropharynx is clear  Uvula midline  Tonsils: No tonsillar exudate or tonsillar abscesses  Eyes:      General: Lids are normal  Vision grossly intact  Gaze aligned appropriately  Conjunctiva/sclera: Conjunctivae normal    Neck:      Trachea: Trachea and phonation normal    Cardiovascular:      Rate and Rhythm: Normal rate and regular rhythm  Pulses: Normal pulses  Heart sounds: Normal heart sounds  Pulmonary:      Effort: Pulmonary effort is normal  No tachypnea, accessory muscle usage or respiratory distress  Comments: Decreased breath sounds with faint crackles at the bases  Musculoskeletal:      Cervical back: Neck supple  No edema, erythema, rigidity or tenderness  Lymphadenopathy:      Cervical: No cervical adenopathy  Skin:     General: Skin is warm and dry  Coloration: Skin is not pale  Neurological:      Mental Status: She is alert and oriented to person, place, and time  Mental status is at baseline  Psychiatric:         Attention and Perception: Attention and perception normal          Mood and Affect: Mood and affect normal          Speech: Speech normal          Behavior: Behavior normal  Behavior is cooperative  Thought Content:  Thought content normal          Cognition and Memory: Cognition and memory normal          Judgment: Judgment normal

## 2022-04-04 NOTE — PATIENT INSTRUCTIONS
1  Acute Bacterial Bronchitis  - Zithromax prescribed, to be completed as directed  - Tessalon pearls prescribed, to be taken as needed for cough   - rest and drink plenty of fluids  - take Tylenol or Motrin as needed   - run a humidifier at home   - follow up w/ PCP for re-check in 3-5 days   - if symptoms persist despite treatment, worsen, or any new symptoms present, patient is to be seen in the ER

## 2022-05-30 ENCOUNTER — OFFICE VISIT (OUTPATIENT)
Dept: URGENT CARE | Facility: CLINIC | Age: 37
End: 2022-05-30
Payer: COMMERCIAL

## 2022-05-30 VITALS
TEMPERATURE: 97.4 F | WEIGHT: 272 LBS | OXYGEN SATURATION: 99 % | HEIGHT: 65 IN | RESPIRATION RATE: 18 BRPM | HEART RATE: 73 BPM | BODY MASS INDEX: 45.32 KG/M2

## 2022-05-30 DIAGNOSIS — M25.511 ACUTE PAIN OF RIGHT SHOULDER: ICD-10-CM

## 2022-05-30 DIAGNOSIS — S20.162A TICK BITE OF LEFT BREAST, INITIAL ENCOUNTER: Primary | ICD-10-CM
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
## 2022-05-30 PROCEDURE — 99203 OFFICE O/P NEW LOW 30 MIN: CPT | Performed by: PHYSICIAN ASSISTANT

## 2022-05-30 RX ORDER — DOXYCYCLINE 100 MG/1
100 CAPSULE ORAL 2 TIMES DAILY
Qty: 2 CAPSULE | Refills: 0 | Status: SHIPPED | OUTPATIENT
Start: 2022-05-30 | End: 2022-05-31

## 2022-05-30 NOTE — PROGRESS NOTES
[facility]        NAME: Seb Solorzano is a 39 y o  female  : 1985    MRN: [id number]  DATE: May 30, 2022  TIME: 2:32 PM    Assessment and Plan   Tick bite of left breast, initial encounter [S20 162A, W57  XXXA]  1  Tick bite of left breast, initial encounter  doxycycline monohydrate (MONODOX) 100 mg capsule   2  Acute pain of right shoulder       Discussed strict return to care precautions as well as red flag symptoms which should prompt immediate ED referral  Pt verbalized understanding and is in agreement with plan  Please follow up with your primary care provider within the next week  Please remember that your visit today was with an urgent care provider and should not replace follow up with your primary care provider for chronic medical issues or annual physicals  Patient Instructions       Follow up with PCP in 3-5 days  Proceed to  ER if symptoms worsen  Chief Complaint     Chief Complaint   Patient presents with    Tick Bite     Small bite on left breat has tic with her undetermined time in place    Shoulder Pain     Has pain and popping sound in rt shoulder not initial occurrence          History of Present Illness       Pt presents with tick bite on L breast since this morning as well as R shoulder pain x several days  Tick Bite  This is a new problem  The current episode started today (unsure how long tick was present; able to pull it off of herself this morning)  The problem occurs constantly  The problem has been unchanged  Pertinent negatives include no abdominal pain, anorexia, arthralgias, change in bowel habit, chest pain, chills, congestion, coughing, diaphoresis, fatigue, fever, headaches, joint swelling, myalgias, nausea, neck pain, numbness, rash, sore throat, swollen glands, visual change, vomiting or weakness  Nothing aggravates the symptoms  She has tried nothing for the symptoms  Shoulder Pain   The pain is present in the right shoulder   This is a new problem  The current episode started in the past 7 days  History of extremity trauma: began after sleeping with head on arm with arm flexed at shoulder  The problem occurs constantly  The problem has been unchanged  The quality of the pain is described as aching  The pain is moderate  Pertinent negatives include no fever, inability to bear weight, limited range of motion, numbness, stiffness or tingling  The symptoms are aggravated by activity  She has tried acetaminophen for the symptoms  The treatment provided no relief  Review of Systems   Review of Systems   Constitutional: Negative for chills, diaphoresis, fatigue and fever  HENT: Negative for congestion and sore throat  Respiratory: Negative for cough and shortness of breath  Cardiovascular: Negative for chest pain  Gastrointestinal: Negative for abdominal pain, anorexia, change in bowel habit, nausea and vomiting  Musculoskeletal: Negative for arthralgias, back pain, joint swelling, myalgias, neck pain and stiffness  Skin: Negative for rash  Neurological: Negative for tingling, weakness, numbness and headaches           Current Medications       Current Outpatient Medications:     benzonatate (TESSALON) 200 MG capsule, Take 1 capsule (200 mg total) by mouth 3 (three) times a day as needed for cough, Disp: 20 capsule, Rfl: 0    doxycycline monohydrate (MONODOX) 100 mg capsule, Take 1 capsule (100 mg total) by mouth 2 (two) times a day for 1 day, Disp: 2 capsule, Rfl: 0    folic acid (FOLVITE) 532 mcg tablet, Take 1 mg by mouth, Disp: , Rfl:     lamoTRIgine (LaMICtal) 100 mg tablet, Take 100 mg by mouth daily 500 mg total/ day, Disp: , Rfl:     levonorgestrel (MIRENA) 20 MCG/24HR IUD, 1 each by Intrauterine route once, Disp: , Rfl:     metaxalone (SKELAXIN) 800 mg tablet, Take 1 tablet (800 mg total) by mouth 3 (three) times a day (Patient not taking: Reported on 4/4/2022 ), Disp: 20 tablet, Rfl: 0    Current Allergies Allergies as of 2022    (No Known Allergies)            The following portions of the patient's history were reviewed and updated as appropriate: allergies, current medications, past family history, past medical history, past social history, past surgical history and problem list      Past Medical History:   Diagnosis Date    Seizures ([address] )        Past Surgical History:   Procedure Laterality Date    ABDOMINAL SURGERY       SECTION      x1    GASTRIC BYPASS         History reviewed  No pertinent family history  Medications have been verified  Objective   Pulse 73   Temp (!) 97 4 °F (36 3 °C)   Resp 18   Ht 5' 5" (1 651 m)   Wt 123 kg (272 lb)   SpO2 99%   BMI 45 26 kg/m²        Physical Exam     Physical Exam  Vitals and nursing note reviewed  Constitutional:       General: She is not in acute distress  Appearance: Normal appearance  She is not ill-appearing  HENT:      Head: Normocephalic and atraumatic  Cardiovascular:      Rate and Rhythm: Normal rate  Pulmonary:      Effort: Pulmonary effort is normal  No respiratory distress  Chest:       Musculoskeletal:      Right shoulder: Tenderness present  No swelling or bony tenderness  Normal range of motion  Normal strength  Normal pulse  Skin:     General: Skin is warm and dry  Capillary Refill: Capillary refill takes less than 2 seconds  Findings: Erythema (very small erythematous puncture wound present on L breast) present  Neurological:      Mental Status: She is alert and oriented to person, place, and time     Psychiatric:         Behavior: Behavior normal

## 2022-05-31 ENCOUNTER — OFFICE VISIT (OUTPATIENT)
Dept: URGENT CARE | Facility: CLINIC | Age: 37
End: 2022-05-31
Payer: COMMERCIAL

## 2022-05-31 VITALS
TEMPERATURE: 97.1 F | SYSTOLIC BLOOD PRESSURE: 103 MMHG | DIASTOLIC BLOOD PRESSURE: 61 MMHG | OXYGEN SATURATION: 98 % | WEIGHT: 272 LBS | RESPIRATION RATE: 16 BRPM | BODY MASS INDEX: 45.32 KG/M2 | HEART RATE: 82 BPM | HEIGHT: 65 IN

## 2022-05-31 DIAGNOSIS — M70.849: Primary | ICD-10-CM

## 2022-05-31 PROCEDURE — 99213 OFFICE O/P EST LOW 20 MIN: CPT | Performed by: PHYSICIAN ASSISTANT

## 2022-05-31 PROCEDURE — 3008F BODY MASS INDEX DOCD: CPT | Performed by: FAMILY MEDICINE

## 2022-05-31 NOTE — PATIENT INSTRUCTIONS
Recommend wearing provided finger splint, icing the area been taking ibuprofen and Tylenol as needed for discomfort  Suspect symptoms resolve in the next 1-2 weeks  Continue recently prescribed antibiotic for Lyme prophylaxis

## 2022-05-31 NOTE — PROGRESS NOTES
[facility]        NAME: Junior Álvarez is a 39 y o  female  : 1985    MRN: [id number]  DATE: May 31, 2022  TIME: 12:13 PM    Assessment and Plan   Bursitis of finger [M70 849]  1  Bursitis of finger           Patient Instructions     Patient Instructions   Recommend wearing provided finger splint, icing the area been taking ibuprofen and Tylenol as needed for discomfort  Suspect symptoms resolve in the next 1-2 weeks  Continue recently prescribed antibiotic for Lyme prophylaxis  Follow up with PCP in 3-5 days  Proceed to  ER if symptoms worsen  Chief Complaint     Chief Complaint   Patient presents with    Hand Pain     Pt presents with right index finger swelling and redness 2x days         History of Present Illness       Patient is a 60-year-old female presenting today with right finger pain and swelling x2 days  Patient notes she was recently seen here yesterday for tick bite and started on doxycycline for Lyme prophylaxis  Has been taking antibiotic as prescribed  Notes that a couple days ago after caring and some groceries she noticed some swelling of her right index finger, notes there is still red and swollen and slightly painful with flexion extension of right index finger, has not taken any medications or alleviating factors for symptoms  Denies numbness, tingling, bruising, swelling, fever  Review of Systems   Review of Systems   Constitutional: Negative for chills and fever  HENT: Negative for ear pain and sore throat  Eyes: Negative for pain and visual disturbance  Respiratory: Negative for cough and shortness of breath  Cardiovascular: Negative for chest pain and palpitations  Gastrointestinal: Negative for abdominal pain and vomiting  Genitourinary: Negative for dysuria and hematuria  Musculoskeletal:        See HPI   Skin: Negative for color change and rash  Neurological: Negative for seizures and syncope     All other systems reviewed and are negative  Current Medications       Current Outpatient Medications:     benzonatate (TESSALON) 200 MG capsule, Take 1 capsule (200 mg total) by mouth 3 (three) times a day as needed for cough, Disp: 20 capsule, Rfl: 0    doxycycline monohydrate (MONODOX) 100 mg capsule, Take 1 capsule (100 mg total) by mouth 2 (two) times a day for 1 day, Disp: 2 capsule, Rfl: 0    folic acid (FOLVITE) 923 mcg tablet, Take 1 mg by mouth, Disp: , Rfl:     lamoTRIgine (LaMICtal) 100 mg tablet, Take 100 mg by mouth daily 500 mg total/ day, Disp: , Rfl:     levonorgestrel (MIRENA) 20 MCG/24HR IUD, 1 each by Intrauterine route once, Disp: , Rfl:     metaxalone (SKELAXIN) 800 mg tablet, Take 1 tablet (800 mg total) by mouth 3 (three) times a day (Patient not taking: No sig reported), Disp: 20 tablet, Rfl: 0    Current Allergies     Allergies as of 2022    (No Known Allergies)            The following portions of the patient's history were reviewed and updated as appropriate: allergies, current medications, past family history, past medical history, past social history, past surgical history and problem list      Past Medical History:   Diagnosis Date    Seizures ([address] )        Past Surgical History:   Procedure Laterality Date    ABDOMINAL SURGERY       SECTION      x1    GASTRIC BYPASS         History reviewed  No pertinent family history  Medications have been verified  Objective   /61   Pulse 82   Temp (!) 97 1 °F (36 2 °C)   Resp 16   Ht 5' 5" (1 651 m)   Wt 123 kg (272 lb)   SpO2 98%   BMI 45 26 kg/m²        Physical Exam     Physical Exam  Vitals and nursing note reviewed  Constitutional:       General: She is not in acute distress  Appearance: Normal appearance  She is not toxic-appearing  HENT:      Head: Normocephalic and atraumatic  Right Ear: External ear normal       Left Ear: External ear normal    Cardiovascular:      Rate and Rhythm: Normal rate  Pulses: Normal pulses  Pulmonary:      Effort: Pulmonary effort is normal    Musculoskeletal:      Comments: No obvious deformity of right index finger, slight swelling over PIP joint of right index finger, mild tenderness to palpation of area, full ROM of right index finger preserved with slight discomfort upon full flexion and full extension, no redness, no erythema, no pain upon percussion, negative kanavel signs  gross sensation intact, less than 2sec capillary refill of affected finger  Findings consistent with bursitis   Skin:     General: Skin is warm  Capillary Refill: Capillary refill takes less than 2 seconds  Neurological:      General: No focal deficit present  Mental Status: She is alert and oriented to person, place, and time

## 2022-06-02 ENCOUNTER — OFFICE VISIT (OUTPATIENT)
Dept: FAMILY MEDICINE CLINIC | Facility: CLINIC | Age: 37
End: 2022-06-02
Payer: COMMERCIAL

## 2022-06-02 ENCOUNTER — TELEPHONE (OUTPATIENT)
Dept: ADMINISTRATIVE | Facility: OTHER | Age: 37
End: 2022-06-02

## 2022-06-02 VITALS
SYSTOLIC BLOOD PRESSURE: 110 MMHG | DIASTOLIC BLOOD PRESSURE: 82 MMHG | WEIGHT: 274 LBS | BODY MASS INDEX: 46.78 KG/M2 | HEIGHT: 64 IN | RESPIRATION RATE: 14 BRPM | TEMPERATURE: 97.8 F | OXYGEN SATURATION: 99 % | HEART RATE: 76 BPM

## 2022-06-02 DIAGNOSIS — R53.83 FATIGUE, UNSPECIFIED TYPE: ICD-10-CM

## 2022-06-02 DIAGNOSIS — Z00.00 ANNUAL PHYSICAL EXAM: Primary | ICD-10-CM

## 2022-06-02 DIAGNOSIS — E66.01 MORBID OBESITY WITH BMI OF 45.0-49.9, ADULT (HCC): ICD-10-CM

## 2022-06-02 DIAGNOSIS — Z13.1 SCREENING FOR DIABETES MELLITUS: ICD-10-CM

## 2022-06-02 DIAGNOSIS — G40.909 SEIZURE DISORDER (HCC): ICD-10-CM

## 2022-06-02 DIAGNOSIS — Z13.6 SCREENING FOR CARDIOVASCULAR CONDITION: ICD-10-CM

## 2022-06-02 PROCEDURE — 99385 PREV VISIT NEW AGE 18-39: CPT | Performed by: FAMILY MEDICINE

## 2022-06-02 PROCEDURE — 3008F BODY MASS INDEX DOCD: CPT | Performed by: FAMILY MEDICINE

## 2022-06-02 PROCEDURE — 1036F TOBACCO NON-USER: CPT | Performed by: FAMILY MEDICINE

## 2022-06-02 PROCEDURE — 3725F SCREEN DEPRESSION PERFORMED: CPT | Performed by: FAMILY MEDICINE

## 2022-06-02 RX ORDER — MULTIVITAMIN
1 TABLET ORAL DAILY
COMMUNITY

## 2022-06-02 RX ORDER — PHENOL 1.4 %
1 AEROSOL, SPRAY (ML) MUCOUS MEMBRANE AS NEEDED
COMMUNITY

## 2022-06-02 NOTE — PATIENT INSTRUCTIONS

## 2022-06-02 NOTE — TELEPHONE ENCOUNTER
Upon review of the In Basket request we were able to locate, review, and update the patient chart as requested for HIV  Any additional questions or concerns should be emailed to the Practice Liaisons via [email or web address] email, please do not reply via In Basket      Thank you  Gregory Valdez MA

## 2022-06-02 NOTE — PROGRESS NOTES
[facility]    NAME: Jackqueline Schilder  AGE: 39 y o  SEX: female  : 1985     DATE: 2022     Assessment and Plan:     Problem List Items Addressed This Visit    None     Visit Diagnoses     Annual physical exam    -  Primary    Carmencita Doe appears well  She is to continue to work on a lower carb diet, and regular exercise  FBW ordered  Relevant Orders    Comprehensive metabolic panel    Lipid Panel with Direct LDL reflex    CBC and differential    TSH, 3rd generation with Free T4 reflex    Morbid obesity with BMI of 45 0-49 9, adult ([facility])        Diet and exercise as above  Seizure disorder ([address] )        Stable; on Lamictal - continues f/u with Neurology  Screening for diabetes mellitus        Relevant Orders    Comprehensive metabolic panel    Screening for cardiovascular condition        Relevant Orders    Lipid Panel with Direct LDL reflex    Fatigue, unspecified type        Relevant Orders    CBC and differential    TSH, 3rd generation with Free T4 reflex          Immunizations and preventive care screenings were discussed with patient today  Appropriate education was printed on patient's after visit summary  Counseling:  Dental Health: discussed importance of regular tooth brushing, flossing, and dental visits  · Exercise: the importance of regular exercise/physical activity was discussed  Recommend exercise 3-5 times per week for at least 30 minutes  BMI Counseling: Body mass index is 46 95 kg/m²  The BMI is above normal  Nutrition recommendations include moderation in carbohydrate intake  Exercise recommendations include exercising 3-5 times per week  No pharmacotherapy was ordered  Patient referred to PCP  Rationale for BMI follow-up plan is due to patient being overweight or obese  Depression Screening and Follow-up Plan: Patient was screened for depression during today's encounter   They screened negative with a PHQ-2 score of 0  Return in 6 months (on 12/2/2022) for Recheck  Chief Complaint:     Chief Complaint   Patient presents with    Physical Exam     Patient being seen for physical       History of Present Illness:     Adult Annual Physical   Patient here for a comprehensive physical exam  The patient reports no problems  New Patient today - transitioning PCPs  Sees GYN in [place]  Pt is vaccinated against COV-19, including booster [employer] booster); even had COV-19 as well  Hx of seizures in past - last in 2009  Sees Neurology  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)  , 2 kids - older son with hx of autism  Diet and Physical Activity  · Diet/Nutrition: limited junk food  · Exercise: 3-4 times a week on average  Depression Screening  PHQ-2/9 Depression Screening    Little interest or pleasure in doing things: 0 - not at all  Feeling down, depressed, or hopeless: 0 - not at all  PHQ-2 Score: 0  PHQ-2 Interpretation: Negative depression screen       General Health  · Sleep: sleeps well  · Hearing: normal - bilateral   · Vision: no vision problems  · Dental: regular dental visits  /GYN Health  · Last menstrual period: No issues  · Contraceptive method: IUD placement  · History of STDs?: no      Review of Systems:     Review of Systems   Constitutional: Negative for activity change  Respiratory: Negative for shortness of breath  Cardiovascular: Negative for chest pain  Gastrointestinal: Negative for abdominal pain and blood in stool  Genitourinary: Negative for menstrual problem  No new breast lumps on self-examination  Psychiatric/Behavioral: Negative for dysphoric mood  The patient is not nervous/anxious  Some mild anxiousness due to stressors        Past Medical History:     Past Medical History:   Diagnosis Date    Seizures [facility])       Past Surgical History:     Past Surgical History:   Procedure Laterality Date    ABDOMINAL SURGERY       SECTION      x1    GASTRIC BYPASS        Social History:     Social History     Socioeconomic History    Marital status: /Civil Union     Spouse name: None    Number of children: None    Years of education: None    Highest education level: None   Occupational History    None   Tobacco Use    Smoking status: Never Smoker    Smokeless tobacco: Never Used   Vaping Use    Vaping Use: Never used   Substance and Sexual Activity    Alcohol use: Yes     Comment: rare    Drug use: No    Sexual activity: Not Currently     Birth control/protection: I U D     Other Topics Concern    None   Social History Narrative    None     Social Determinants of Health     Financial Resource Strain: Not on file   Food Insecurity: Not on file   Transportation Needs: Not on file   Physical Activity: Not on file   Stress: Not on file   Social Connections: Not on file   Intimate Partner Violence: Not on file   Housing Stability: Not on file      Family History:     Family History   Problem Relation Age of Onset    Breast cancer Mother     Hypertension Mother     Hypertension Father     Gout Father     Autism Son     Cancer Maternal Grandfather     Cancer Paternal Grandmother     Diabetes Paternal Grandmother       Current Medications:     Current Outpatient Medications   Medication Sig Dispense Refill    folic acid (FOLVITE) 337 mcg tablet Take 1 mg by mouth      lamoTRIgine (LaMICtal) 100 mg tablet Take 100 mg by mouth daily 500 mg total/ day      levonorgestrel (MIRENA) 20 MCG/24HR IUD 1 each by Intrauterine route once      Melatonin 10 MG TABS Take 1 tablet by mouth if needed      Multiple Vitamin (multivitamin) tablet Take 1 tablet by mouth daily      benzonatate (TESSALON) 200 MG capsule Take 1 capsule (200 mg total) by mouth 3 (three) times a day as needed for cough (Patient not taking: Reported on 2022) 20 capsule 0    metaxalone (SKELAXIN) 800 mg tablet Take 1 tablet (800 mg total) by mouth 3 (three) times a day (Patient not taking: No sig reported) 20 tablet 0     No current facility-administered medications for this visit  Allergies:     No Known Allergies   Physical Exam:     /82 (BP Location: Left arm, Patient Position: Sitting, Cuff Size: Large)   Pulse 76   Temp 97 8 °F (36 6 °C) (Tympanic)   Resp 14   Ht 5' 4 06" (1 627 m)   Wt 124 kg (274 lb)   SpO2 99%   BMI 46 95 kg/m²     Physical Exam  Vitals and nursing note reviewed  Constitutional:       General: She is not in acute distress  Appearance: Normal appearance  She is not ill-appearing, toxic-appearing or diaphoretic  HENT:      Head: Normocephalic and atraumatic  Eyes:      General: No scleral icterus  Conjunctiva/sclera: Conjunctivae normal    Cardiovascular:      Rate and Rhythm: Normal rate and regular rhythm  Heart sounds: Normal heart sounds  No murmur heard  No friction rub  No gallop  Pulmonary:      Effort: Pulmonary effort is normal  No respiratory distress  Breath sounds: Normal breath sounds  No stridor  No wheezing, rhonchi or rales  Abdominal:      General: Abdomen is flat  Bowel sounds are normal  There is no distension  Palpations: Abdomen is soft  There is no mass  Tenderness: There is no abdominal tenderness  There is no guarding or rebound  Musculoskeletal:      Cervical back: Normal range of motion and neck supple  No rigidity or tenderness  Lymphadenopathy:      Cervical: No cervical adenopathy  Neurological:      Mental Status: She is alert and oriented to person, place, and time  Psychiatric:         Mood and Affect: Mood normal          Behavior: Behavior normal          Thought Content: Thought content normal          Judgment: Judgment normal           Joana Leiva was seen today for physical exam     Diagnoses and all orders for this visit:    Annual physical exam  Comments:  Joana Leiva appears well    She is to continue to work on a lower carb diet, and regular exercise  FBW ordered  Orders:  -     Comprehensive metabolic panel; Future  -     Lipid Panel with Direct LDL reflex; Future  -     CBC and differential; Future  -     TSH, 3rd generation with Free T4 reflex; Future    Morbid obesity with BMI of 45 0-49 9, adult ([facility] )  Comments:  Diet and exercise as above  Seizure disorder ([facility] )  Comments:  Stable; on Lamictal - continues f/u with Neurology  Screening for diabetes mellitus  -     Comprehensive metabolic panel; Future    Screening for cardiovascular condition  -     Lipid Panel with Direct LDL reflex; Future    Fatigue, unspecified type  -     CBC and differential; Future  -     TSH, 3rd generation with Free T4 reflex;  Future        Srinivas Miller, DO   [facility]

## 2022-06-02 NOTE — TELEPHONE ENCOUNTER
----- Message from Aj Gibbons LPN sent at 3/4/4096 11:25 AM EDT -----  Regarding: care gap request  06/02/22 11:25 AM    Hello, our patient attached above has had HIV completed/performed  Please assist in updating the patient chart by pulling the Care Everywhere (CE) document  The date of service is 5/12/2020       Thank you,  KATELYN Dockery PG

## 2022-10-02 ENCOUNTER — OFFICE VISIT (OUTPATIENT)
Dept: URGENT CARE | Facility: CLINIC | Age: 37
End: 2022-10-02
Payer: COMMERCIAL

## 2022-10-02 VITALS
TEMPERATURE: 97.5 F | WEIGHT: 276 LBS | DIASTOLIC BLOOD PRESSURE: 70 MMHG | RESPIRATION RATE: 16 BRPM | BODY MASS INDEX: 47.12 KG/M2 | HEIGHT: 64 IN | HEART RATE: 120 BPM | OXYGEN SATURATION: 99 % | SYSTOLIC BLOOD PRESSURE: 110 MMHG

## 2022-10-02 DIAGNOSIS — M77.8 TENDINITIS OF LEFT WRIST: Primary | ICD-10-CM

## 2022-10-02 PROCEDURE — 99203 OFFICE O/P NEW LOW 30 MIN: CPT | Performed by: PHYSICIAN ASSISTANT

## 2022-10-02 RX ORDER — NAPROXEN 500 MG/1
500 TABLET ORAL 2 TIMES DAILY WITH MEALS
Qty: 14 TABLET | Refills: 0 | Status: SHIPPED | OUTPATIENT
Start: 2022-10-02 | End: 2022-10-09

## 2022-10-02 NOTE — PROGRESS NOTES
[address]        NAME: Malika Ennis is a 39 y o  female  : 1985    MRN: [id number]  DATE: 2022  TIME: 9:09 AM    Assessment and Plan   Tendinitis of left wrist [M77 8]  1  Tendinitis of left wrist  naproxen (Naprosyn) 500 mg tablet    Ambulatory Referral to Orthopedic Surgery       1  A  Patient instructed to wear a wrist brace as needed for comfort and stability of the wrist   B  Patient instructed to take Naproxen for pain relief and educated to not take any other NSAIDs while taking this medication  If patient needs additional pain relief, she may take Tylenol  C  Referral to orthopedics  Patient Instructions       Follow up with PCP in 3-5 days  Proceed to  ER if symptoms worsen  Chief Complaint     Chief Complaint   Patient presents with    Wrist Pain     Pt reports of left wrist pain, atraumatic, started approx 1 week ago  History of Present Illness       Maine Armstrong is a 39year old female with a PMH of seizures presenting to the office with a complaint of left wrist pain for the past week and a half  She is right hand dominant  She was carrying a heavy, full trash can up hill to her house about a week and a half ago and has felt a dull, 3/10 constant pain in the dorsal side of her wrist since then  The pain becomes a sharper 8/10 with twisting movements of the wrist  She has some discomfort and tingling in her left forearm up to her elbow as well  She has tried wearing a wrist brace, taking Tylenol, and icing the wrist with some relief of pain  She has never had anything like this before  Wrist Pain   Associated symptoms include numbness (Mild tingling of her left forearm)  Review of Systems   Review of Systems   Constitutional: Negative  HENT: Negative  Negative for congestion and sore throat  Eyes: Negative  Respiratory: Negative  Negative for cough and shortness of breath  Cardiovascular: Negative for chest pain and palpitations  Gastrointestinal: Negative for abdominal pain  Musculoskeletal: Positive for myalgias  Neurological: Positive for numbness (Mild tingling of her left forearm)  Negative for dizziness and headaches           Current Medications       Current Outpatient Medications:     naproxen (Naprosyn) 500 mg tablet, Take 1 tablet (500 mg total) by mouth 2 (two) times a day with meals for 7 days, Disp: 14 tablet, Rfl: 0    benzonatate (TESSALON) 200 MG capsule, Take 1 capsule (200 mg total) by mouth 3 (three) times a day as needed for cough (Patient not taking: Reported on 2022), Disp: 20 capsule, Rfl: 0    folic acid (FOLVITE) 096 mcg tablet, Take 1 mg by mouth, Disp: , Rfl:     lamoTRIgine (LaMICtal) 100 mg tablet, Take 100 mg by mouth daily 500 mg total/ day, Disp: , Rfl:     levonorgestrel (MIRENA) 20 MCG/24HR IUD, 1 each by Intrauterine route once, Disp: , Rfl:     Melatonin 10 MG TABS, Take 1 tablet by mouth if needed, Disp: , Rfl:     metaxalone (SKELAXIN) 800 mg tablet, Take 1 tablet (800 mg total) by mouth 3 (three) times a day (Patient not taking: No sig reported), Disp: 20 tablet, Rfl: 0    Multiple Vitamin (multivitamin) tablet, Take 1 tablet by mouth daily, Disp: , Rfl:     Current Allergies     Allergies as of 10/02/2022    (No Known Allergies)            The following portions of the patient's history were reviewed and updated as appropriate: allergies, current medications, past family history, past medical history, past social history, past surgical history and problem list      Past Medical History:   Diagnosis Date    Seizures ([address] )        Past Surgical History:   Procedure Laterality Date    ABDOMINAL SURGERY       SECTION      x1    GASTRIC BYPASS         Family History   Problem Relation Age of Onset    Breast cancer Mother     Hypertension Mother     Hypertension Father    Arnie McMillan Gout Father     Autism Son     Cancer Maternal Grandfather     Cancer Paternal Grandmother     Diabetes Paternal Grandmother          Medications have been verified  Objective   /70   Pulse (!) 120   Temp 97 5 °F (36 4 °C)   Resp 16   Ht 5' 4" (1 626 m)   Wt 125 kg (276 lb)   SpO2 99%   BMI 47 38 kg/m²        Physical Exam     Physical Exam  Constitutional:       General: She is not in acute distress  Appearance: Normal appearance  HENT:      Head: Normocephalic and atraumatic  Cardiovascular:      Rate and Rhythm: Normal rate and regular rhythm  Pulses: Normal pulses  Heart sounds: Normal heart sounds  Pulmonary:      Effort: Pulmonary effort is normal       Breath sounds: Normal breath sounds  Musculoskeletal:         General: Tenderness present  Right elbow: Normal       Left elbow: No swelling  Normal range of motion  No tenderness  Right forearm: Normal       Left forearm: Tenderness (mild tenderness over the ulnar aspect of the forearm) present  No swelling  Right wrist: Normal       Left wrist: Tenderness (Tenderness over the dorsal ulnar aspect of the wrist) present  No swelling, deformity, bony tenderness or snuff box tenderness  Normal range of motion (strength 5/5)  Comments: Negative Finkelstein's     Skin:     General: Skin is warm and dry  Findings: No bruising or erythema  Neurological:      General: No focal deficit present  Mental Status: She is alert     Psychiatric:         Behavior: Behavior normal

## 2022-10-12 ENCOUNTER — OFFICE VISIT (OUTPATIENT)
Dept: OBGYN CLINIC | Facility: CLINIC | Age: 37
End: 2022-10-12
Payer: COMMERCIAL

## 2022-10-12 ENCOUNTER — APPOINTMENT (OUTPATIENT)
Dept: RADIOLOGY | Facility: CLINIC | Age: 37
End: 2022-10-12
Payer: COMMERCIAL

## 2022-10-12 VITALS
HEART RATE: 90 BPM | WEIGHT: 276 LBS | SYSTOLIC BLOOD PRESSURE: 120 MMHG | BODY MASS INDEX: 47.12 KG/M2 | HEIGHT: 64 IN | DIASTOLIC BLOOD PRESSURE: 80 MMHG

## 2022-10-12 DIAGNOSIS — M77.8 TENDINITIS OF LEFT WRIST: ICD-10-CM

## 2022-10-12 PROCEDURE — 73110 X-RAY EXAM OF WRIST: CPT

## 2022-10-12 PROCEDURE — 99203 OFFICE O/P NEW LOW 30 MIN: CPT | Performed by: ORTHOPAEDIC SURGERY

## 2022-10-12 NOTE — LETTER
October 12, 2022     Bill Dempsey PA-C  [address] [place] [id number]    Patient: Sunni Arevalo   YOB: 1985   Date of Visit: 10/12/2022       Dear Dr Bita Broderick: Thank you for referring Sunni Arevalo to me for evaluation  Below are my notes for this consultation  If you have questions, please do not hesitate to call me  I look forward to following your patient along with you  Sincerely,        Rudy Beard DO        CC: No Recipients  Rudy Beard DO  10/12/2022 10:46 AM  Signed  Assessment/Plan:  1  Tendinitis of left wrist  Ambulatory Referral to Orthopedic Surgery    XR wrist 3+ vw left       Milena Mayorga has left-sided wrist pain consistent with extensor tendinitis  She has some tenderness over the ECU tendon but no significant weakness on examination today  She has no pain along the radial aspect of the wrist or thumb  I recommended use of a wrist splint  She does not necessarily need a thumb spica splint that she is wearing but a cock-up wrist splint be reasonable  I did give her home exercises which she can begin doing after using the splint and taking anti-inflammatories for another 1-2 weeks  She verbalized understanding this plan  Follow up as needed  Subjective:   Sunni Arevalo is a 39 y o  female who presents to the office for evaluation for left-sided wrist pain  She states that she has been having increased discomfort her left wrist for the past 2 weeks  Pain began after she was doing a lot of yd work  She denies any fall or trauma to the wrist   She describes an intermittent aching pain over the ulnar aspect of her wrist   She went to urgent care and was placed in a wrist splint  She states that the splint has been helping  She denies any numbness or tingling throughout the hand  She denies any significant swelling or bruising  Review of Systems   Constitutional: Negative for chills, fever and unexpected weight change     HENT: Negative for hearing loss, nosebleeds and sore throat  Eyes: Negative for pain, redness and visual disturbance  Respiratory: Negative for cough, shortness of breath and wheezing  Cardiovascular: Negative for chest pain, palpitations and leg swelling  Gastrointestinal: Negative for abdominal pain, nausea and vomiting  Endocrine: Negative for polydipsia and polyuria  Genitourinary: Negative for dysuria and hematuria  Musculoskeletal:        See HPI   Skin: Negative for rash and wound  Neurological: Negative for dizziness, numbness and headaches  Psychiatric/Behavioral: Negative for decreased concentration and suicidal ideas  The patient is not nervous/anxious  Past Medical History:   Diagnosis Date   • Seizures ([address] )        Past Surgical History:   Procedure Laterality Date   • ABDOMINAL SURGERY     •  SECTION      x1   • GASTRIC BYPASS         Family History   Problem Relation Age of Onset   • Breast cancer Mother    • Hypertension Mother    • Hypertension Father    • Gout Father    • Autism Son    • Cancer Maternal Grandfather    • Cancer Paternal Grandmother    • Diabetes Paternal Grandmother        Social History     Occupational History   • Not on file   Tobacco Use   • Smoking status: Never Smoker   • Smokeless tobacco: Never Used   Vaping Use   • Vaping Use: Never used   Substance and Sexual Activity   • Alcohol use: Yes     Comment: rare   • Drug use: No   • Sexual activity: Not Currently     Birth control/protection: I U D           Current Outpatient Medications:   •  benzonatate (TESSALON) 200 MG capsule, Take 1 capsule (200 mg total) by mouth 3 (three) times a day as needed for cough (Patient not taking: Reported on 2022), Disp: 20 capsule, Rfl: 0  •  folic acid (FOLVITE) 050 mcg tablet, Take 1 mg by mouth, Disp: , Rfl:   •  lamoTRIgine (LaMICtal) 100 mg tablet, Take 100 mg by mouth daily 500 mg total/ day, Disp: , Rfl:   •  levonorgestrel (MIRENA) 20 MCG/24HR IUD, 1 each by Intrauterine route once, Disp: , Rfl:   •  Melatonin 10 MG TABS, Take 1 tablet by mouth if needed, Disp: , Rfl:   •  metaxalone (SKELAXIN) 800 mg tablet, Take 1 tablet (800 mg total) by mouth 3 (three) times a day (Patient not taking: No sig reported), Disp: 20 tablet, Rfl: 0  •  Multiple Vitamin (multivitamin) tablet, Take 1 tablet by mouth daily, Disp: , Rfl:   •  naproxen (Naprosyn) 500 mg tablet, Take 1 tablet (500 mg total) by mouth 2 (two) times a day with meals for 7 days, Disp: 14 tablet, Rfl: 0    No Known Allergies    Objective:  Vitals:    10/12/22 1007   BP: 120/80   Pulse: 90       Left Hand Exam     Tenderness   Left hand tenderness location: Mild tenderness to palpation over ECU tendon  No radial sided tenderness  Range of Motion   Wrist   Extension: normal   Flexion: normal   Pronation: normal   Supination: normal     Muscle Strength   Wrist extension: 5/5   Wrist flexion: 5/5   :  5/5     Tests   Tinel's sign (median nerve): negative  Finkelstein's test: negative    Other   Erythema: absent  Sensation: normal  Pulse: present          Strength/Myotome Testing     Left Wrist/Hand   Wrist extension: 5  Wrist flexion: 5    Tests     Left Wrist/Hand   Negative Finkelstein's and Tinel's sign (medial nerve)  Physical Exam  Vitals and nursing note reviewed  Constitutional:       Appearance: She is well-developed  HENT:      Head: Normocephalic and atraumatic  Eyes:      General: No scleral icterus  Extraocular Movements: Extraocular movements intact  Conjunctiva/sclera: Conjunctivae normal    Cardiovascular:      Rate and Rhythm: Normal rate  Pulmonary:      Effort: Pulmonary effort is normal  No respiratory distress  Musculoskeletal:      Cervical back: Normal range of motion and neck supple  Comments: As noted in HPI   Skin:     General: Skin is warm and dry  Neurological:      Mental Status: She is alert and oriented to person, place, and time  Psychiatric:         Behavior: Behavior normal          I have personally reviewed pertinent films in PACS and my interpretation is as follows:  X-rays of left wrist demonstrate no evidence of acute fracture or significant degenerative change      This document was created using speech voice recognition software  Grammatical errors, random word insertions, pronoun errors, and incomplete sentences are an occasional consequence of this system due to software limitations, ambient noise, and hardware issues  Any formal questions or concerns about content, text, or information contained within the body of this dictation should be directly addressed to the provider for clarification

## 2022-10-12 NOTE — PROGRESS NOTES
Assessment/Plan:  1  Tendinitis of left wrist  Ambulatory Referral to Orthopedic Surgery    XR wrist 3+ vw left       Helena Giang has left-sided wrist pain consistent with extensor tendinitis  She has some tenderness over the ECU tendon but no significant weakness on examination today  She has no pain along the radial aspect of the wrist or thumb  I recommended use of a wrist splint  She does not necessarily need a thumb spica splint that she is wearing but a cock-up wrist splint be reasonable  I did give her home exercises which she can begin doing after using the splint and taking anti-inflammatories for another 1-2 weeks  She verbalized understanding this plan  Follow up as needed  Subjective:   Shelly Rausch is a 39 y o  female who presents to the office for evaluation for left-sided wrist pain  She states that she has been having increased discomfort her left wrist for the past 2 weeks  Pain began after she was doing a lot of yd work  She denies any fall or trauma to the wrist   She describes an intermittent aching pain over the ulnar aspect of her wrist   She went to urgent care and was placed in a wrist splint  She states that the splint has been helping  She denies any numbness or tingling throughout the hand  She denies any significant swelling or bruising  Review of Systems   Constitutional: Negative for chills, fever and unexpected weight change  HENT: Negative for hearing loss, nosebleeds and sore throat  Eyes: Negative for pain, redness and visual disturbance  Respiratory: Negative for cough, shortness of breath and wheezing  Cardiovascular: Negative for chest pain, palpitations and leg swelling  Gastrointestinal: Negative for abdominal pain, nausea and vomiting  Endocrine: Negative for polydipsia and polyuria  Genitourinary: Negative for dysuria and hematuria  Musculoskeletal:        See HPI   Skin: Negative for rash and wound     Neurological: Negative for dizziness, numbness and headaches  Psychiatric/Behavioral: Negative for decreased concentration and suicidal ideas  The patient is not nervous/anxious  Past Medical History:   Diagnosis Date   • Seizures ([address] )        Past Surgical History:   Procedure Laterality Date   • ABDOMINAL SURGERY     •  SECTION      x1   • GASTRIC BYPASS         Family History   Problem Relation Age of Onset   • Breast cancer Mother    • Hypertension Mother    • Hypertension Father    • Gout Father    • Autism Son    • Cancer Maternal Grandfather    • Cancer Paternal Grandmother    • Diabetes Paternal Grandmother        Social History     Occupational History   • Not on file   Tobacco Use   • Smoking status: Never Smoker   • Smokeless tobacco: Never Used   Vaping Use   • Vaping Use: Never used   Substance and Sexual Activity   • Alcohol use: Yes     Comment: rare   • Drug use: No   • Sexual activity: Not Currently     Birth control/protection: I U D           Current Outpatient Medications:   •  benzonatate (TESSALON) 200 MG capsule, Take 1 capsule (200 mg total) by mouth 3 (three) times a day as needed for cough (Patient not taking: Reported on 2022), Disp: 20 capsule, Rfl: 0  •  folic acid (FOLVITE) 125 mcg tablet, Take 1 mg by mouth, Disp: , Rfl:   •  lamoTRIgine (LaMICtal) 100 mg tablet, Take 100 mg by mouth daily 500 mg total/ day, Disp: , Rfl:   •  levonorgestrel (MIRENA) 20 MCG/24HR IUD, 1 each by Intrauterine route once, Disp: , Rfl:   •  Melatonin 10 MG TABS, Take 1 tablet by mouth if needed, Disp: , Rfl:   •  metaxalone (SKELAXIN) 800 mg tablet, Take 1 tablet (800 mg total) by mouth 3 (three) times a day (Patient not taking: No sig reported), Disp: 20 tablet, Rfl: 0  •  Multiple Vitamin (multivitamin) tablet, Take 1 tablet by mouth daily, Disp: , Rfl:   •  naproxen (Naprosyn) 500 mg tablet, Take 1 tablet (500 mg total) by mouth 2 (two) times a day with meals for 7 days, Disp: 14 tablet, Rfl: 0    No Known Allergies    Objective:  Vitals:    10/12/22 1007   BP: 120/80   Pulse: 90       Left Hand Exam     Tenderness   Left hand tenderness location: Mild tenderness to palpation over ECU tendon  No radial sided tenderness  Range of Motion   Wrist   Extension: normal   Flexion: normal   Pronation: normal   Supination: normal     Muscle Strength   Wrist extension: 5/5   Wrist flexion: 5/5   :  5/5     Tests   Tinel's sign (median nerve): negative  Finkelstein's test: negative    Other   Erythema: absent  Sensation: normal  Pulse: present          Strength/Myotome Testing     Left Wrist/Hand   Wrist extension: 5  Wrist flexion: 5    Tests     Left Wrist/Hand   Negative Finkelstein's and Tinel's sign (medial nerve)  Physical Exam  Vitals and nursing note reviewed  Constitutional:       Appearance: She is well-developed  HENT:      Head: Normocephalic and atraumatic  Eyes:      General: No scleral icterus  Extraocular Movements: Extraocular movements intact  Conjunctiva/sclera: Conjunctivae normal    Cardiovascular:      Rate and Rhythm: Normal rate  Pulmonary:      Effort: Pulmonary effort is normal  No respiratory distress  Musculoskeletal:      Cervical back: Normal range of motion and neck supple  Comments: As noted in HPI   Skin:     General: Skin is warm and dry  Neurological:      Mental Status: She is alert and oriented to person, place, and time  Psychiatric:         Behavior: Behavior normal          I have personally reviewed pertinent films in PACS and my interpretation is as follows:  X-rays of left wrist demonstrate no evidence of acute fracture or significant degenerative change      This document was created using speech voice recognition software  Grammatical errors, random word insertions, pronoun errors, and incomplete sentences are an occasional consequence of this system due to software limitations, ambient noise, and hardware issues     Any formal questions or concerns about content, text, or information contained within the body of this dictation should be directly addressed to the provider for clarification

## 2022-10-27 ENCOUNTER — VBI (OUTPATIENT)
Dept: ADMINISTRATIVE | Facility: OTHER | Age: 37
End: 2022-10-27

## 2022-12-05 ENCOUNTER — OFFICE VISIT (OUTPATIENT)
Dept: FAMILY MEDICINE CLINIC | Facility: CLINIC | Age: 37
End: 2022-12-05

## 2022-12-05 VITALS
BODY MASS INDEX: 46.67 KG/M2 | TEMPERATURE: 97 F | WEIGHT: 273.4 LBS | HEIGHT: 64 IN | SYSTOLIC BLOOD PRESSURE: 114 MMHG | HEART RATE: 83 BPM | DIASTOLIC BLOOD PRESSURE: 76 MMHG | OXYGEN SATURATION: 98 % | RESPIRATION RATE: 14 BRPM

## 2022-12-05 DIAGNOSIS — Z23 IMMUNIZATION DUE: ICD-10-CM

## 2022-12-05 DIAGNOSIS — G40.909 SEIZURE DISORDER (HCC): Primary | ICD-10-CM

## 2022-12-05 DIAGNOSIS — D17.1 LIPOMA OF TORSO: ICD-10-CM

## 2022-12-05 NOTE — PROGRESS NOTES
FAMILY PRACTICE OFFICE VISIT       NAME: Sherin Elizabeth  AGE: 39 y o  SEX: female       : 1985        MRN: [id number]    DATE: 2022  TIME: 11:11 AM    Assessment and Plan   1  Seizure disorder [facility])  Comments:  Pt is stable on present management - continues f/u with Neurology  2  Immunization due  Comments:  Flu Vaccine given IM, and tolerated well  Orders:  -     influenza vaccine, quadrivalent, 0 5 mL, preservative-free, for adult and pediatric patients 6 mos+ (AFLURIA, FLUARIX, FLULAVAL, FLUZONE)    3  Lipoma of torso  Comments:  Left back - referring to General Surgery  Orders:  -     Ambulatory Referral to General Surgery; Future         There are no Patient Instructions on file for this visit  Chief Complaint     Chief Complaint   Patient presents with   • Follow-up     Patient being seen for 6 month follow up        History of Present Illness   Sherin Elizabeth is a 39y o -year-old female who presents in f/u  Previously ordered FBW has not been done yet  No recent seizures -> just recently saw her Neurologist; also diagnosed with them for ADHD as well  Review of Systems   Review of Systems   Constitutional: Negative for activity change  Respiratory: Negative for shortness of breath  Cardiovascular: Negative for chest pain  Neurological: Negative for seizures         Active Problem List     Patient Active Problem List   Diagnosis   • Postpartum care following  delivery   • Previous  section   • Rectus sheath hematoma         Past Medical History:  Past Medical History:   Diagnosis Date   • Obesity    • Seizures ([address] )        Past Surgical History:  Past Surgical History:   Procedure Laterality Date   • ABDOMINAL SURGERY     •  SECTION      x1   • GASTRIC BYPASS         Family History:  Family History   Problem Relation Age of Onset   • Breast cancer Mother    • Hypertension Mother    • Depression Mother    • Hypertension Father    • Gout Father • Autism Son    • Cancer Maternal Grandfather    • Cancer Paternal Grandmother    • Diabetes Paternal Grandmother    • Glaucoma Paternal Grandmother        Social History:  Social History     Socioeconomic History   • Marital status: /Civil Union     Spouse name: Not on file   • Number of children: Not on file   • Years of education: Not on file   • Highest education level: Not on file   Occupational History   • Not on file   Tobacco Use   • Smoking status: Never   • Smokeless tobacco: Never   Vaping Use   • Vaping Use: Never used   Substance and Sexual Activity   • Alcohol use: Not Currently     Comment: rare   • Drug use: No   • Sexual activity: Not Currently     Partners: Male     Birth control/protection: Abstinence, I U D  Other Topics Concern   • Not on file   Social History Narrative   • Not on file     Social Determinants of Health     Financial Resource Strain: Not on file   Food Insecurity: Not on file   Transportation Needs: Not on file   Physical Activity: Not on file   Stress: Not on file   Social Connections: Not on file   Intimate Partner Violence: Not on file   Housing Stability: Not on file       Objective     Vitals:    12/05/22 1050   BP: 114/76   Pulse: 83   Resp: 14   Temp: (!) 97 °F (36 1 °C)   SpO2: 98%     Wt Readings from Last 3 Encounters:   12/05/22 124 kg (273 lb 6 4 oz)   10/12/22 125 kg (276 lb)   10/02/22 125 kg (276 lb)       Physical Exam  Vitals and nursing note reviewed  Constitutional:       General: She is not in acute distress  Appearance: Normal appearance  She is not ill-appearing, toxic-appearing or diaphoretic  HENT:      Head: Normocephalic and atraumatic  Eyes:      General: No scleral icterus  Conjunctiva/sclera: Conjunctivae normal    Cardiovascular:      Rate and Rhythm: Normal rate and regular rhythm  Heart sounds: Normal heart sounds  No murmur heard  No friction rub  No gallop     Pulmonary:      Effort: Pulmonary effort is normal  No respiratory distress  Breath sounds: Normal breath sounds  No stridor  No wheezing, rhonchi or rales  Musculoskeletal:      Cervical back: Normal range of motion and neck supple  No rigidity or tenderness  Back:    Lymphadenopathy:      Cervical: No cervical adenopathy  Neurological:      Mental Status: She is alert and oriented to person, place, and time  Psychiatric:         Mood and Affect: Mood normal          Behavior: Behavior normal          Thought Content:  Thought content normal          Judgment: Judgment normal          Pertinent Laboratory/Diagnostic Studies:  Lab Results   Component Value Date    BUN 10 02/28/2017    CREATININE 0 85 02/28/2017    CALCIUM 8 8 02/28/2017    K 4 0 02/28/2017    CO2 27 02/28/2017     02/28/2017     No results found for: ALT, AST, GGT, ALKPHOS, BILITOT    Lab Results   Component Value Date    WBC 6 50 02/28/2017    HGB 12 0 02/28/2017    HCT 36 9 (L) 02/28/2017    MCV 82 02/28/2017     (H) 02/28/2017       No results found for: TSH    No results found for: CHOL  No results found for: TRIG  No results found for: HDL  No results found for: LDLCALC  No results found for: HGBA1C    Results for orders placed or performed in visit on 06/02/22   Human Immunodeficiency Virus 1/2 Antigen / Antibody ( Fourth Generation) with Reflex Testing   Result Value Ref Range    HIV Screen Non-Reactive     HIV Confirmation Non-Reactive        Orders Placed This Encounter   Procedures   • influenza vaccine, quadrivalent, 0 5 mL, preservative-free, for adult and pediatric patients 6 mos+ (AFLURIA, FLUARIX, FLULAVAL, FLUZONE)   • Ambulatory Referral to General Surgery       ALLERGIES:  No Known Allergies    Current Medications     Current Outpatient Medications   Medication Sig Dispense Refill   • folic acid (FOLVITE) 554 mcg tablet Take 1 mg by mouth     • lamoTRIgine (LaMICtal) 100 mg tablet Take 100 mg by mouth daily 500 mg total/ day     • Multiple Vitamin (multivitamin) tablet Take 1 tablet by mouth daily       No current facility-administered medications for this visit  Health Maintenance     Health Maintenance   Topic Date Due   • Hepatitis C Screening  Never done   • Hepatitis B Vaccine (1 of 3 - 3-dose series) Never done   • COVID-19 Vaccine (1) Never done   • DTaP,Tdap,and Td Vaccines (1 - Tdap) Never done   • Cervical Cancer Screening  Never done   • Influenza Vaccine (1) Never done   • BMI: Followup Plan  06/02/2023   • Annual Physical  06/02/2023   • Depression Screening  12/05/2023   • BMI: Adult  12/05/2023   • HIV Screening  Completed   • Pneumococcal Vaccine: Pediatrics (0 to 5 Years) and At-Risk Patients (6 to 59 Years)  Aged Out   • HIB Vaccine  Aged Out   • IPV Vaccine  Aged Out   • Hepatitis A Vaccine  Aged Out   • Meningococcal ACWY Vaccine  Aged Out   • HPV Vaccine  Aged Out     There is no immunization history for the selected administration types on file for this patient         Faiza Elkins DO

## 2022-12-14 ENCOUNTER — PREP FOR PROCEDURE (OUTPATIENT)
Dept: SURGERY | Facility: CLINIC | Age: 37
End: 2022-12-14

## 2022-12-14 ENCOUNTER — CONSULT (OUTPATIENT)
Dept: SURGERY | Facility: CLINIC | Age: 37
End: 2022-12-14

## 2022-12-14 VITALS
DIASTOLIC BLOOD PRESSURE: 88 MMHG | HEIGHT: 65 IN | HEART RATE: 86 BPM | SYSTOLIC BLOOD PRESSURE: 124 MMHG | WEIGHT: 270 LBS | BODY MASS INDEX: 44.98 KG/M2

## 2022-12-14 DIAGNOSIS — R22.2 MASS ON BACK: Primary | ICD-10-CM

## 2022-12-14 DIAGNOSIS — D17.1 LIPOMA OF TORSO: ICD-10-CM

## 2022-12-14 NOTE — H&P (VIEW-ONLY)
Assessment/Plan:    Diagnoses and all orders for this visit:    Lipoma of torso    Risks and benefits of excision of her back lipomatous mass were discussed with her including potential for seroma formation and she agrees to proceed  Did discuss the fact that this would not help in resolution of her lower back pain  She understands and desires removal     Subjective:      Patient ID: Keny Hoffman is a 39 y o  female  Patient presents for evaluation of a lump on her lower back  She has had the lump for 4 years  The lump has increased in size and she has pain/discomfort  She does complain of some muscular pain of her lower back  Denies any radicular pain down her legs  The following portions of the patient's history were reviewed and updated as appropriate:     She  has a past medical history of Obesity and Seizures ([address] )  She  has a past surgical history that includes Abdominal surgery; Gastric bypass; and  section  Her family history includes Autism in her son; Breast cancer in her mother; Cancer in her maternal grandfather and paternal grandmother; Depression in her mother; Diabetes in her paternal grandmother; Glaucoma in her paternal grandmother; Gout in her father; Hypertension in her father and mother  She  reports that she has never smoked  She has never used smokeless tobacco  She reports that she does not currently use alcohol  She reports that she does not use drugs  Current Outpatient Medications   Medication Sig Dispense Refill   • folic acid (FOLVITE) 981 mcg tablet Take 1 mg by mouth     • lamoTRIgine (LaMICtal) 100 mg tablet Take 100 mg by mouth daily 500 mg total/ day     • Multiple Vitamin (multivitamin) tablet Take 1 tablet by mouth daily       No current facility-administered medications for this visit  She has No Known Allergies       Review of Systems   All other systems reviewed and are negative          Objective:      /88   Pulse 86   Ht 5' 5" (1 651 m) Wt 122 kg (270 lb)   BMI 44 93 kg/m²          Physical Exam  Constitutional:       Appearance: She is obese  HENT:      Head: Atraumatic  Mouth/Throat:      Mouth: Mucous membranes are moist    Eyes:      Extraocular Movements: Extraocular movements intact  Cardiovascular:      Rate and Rhythm: Normal rate  Pulmonary:      Effort: Pulmonary effort is normal    Abdominal:      Palpations: Abdomen is soft  Musculoskeletal:      Cervical back: Normal range of motion  Skin:     General: Skin is warm and dry  Comments: 4 x 6 cm lipomatous mass of the mid back (just left of midline)  No skin changes  Neurological:      Mental Status: She is alert         Extremities: No edema

## 2022-12-14 NOTE — PROGRESS NOTES
Assessment/Plan:    Diagnoses and all orders for this visit:    Lipoma of torso    Risks and benefits of excision of her back lipomatous mass were discussed with her including potential for seroma formation and she agrees to proceed  Did discuss the fact that this would not help in resolution of her lower back pain  She understands and desires removal     Subjective:      Patient ID: Sukumar Matute is a 39 y o  female  Patient presents for evaluation of a lump on her lower back  She has had the lump for 4 years  The lump has increased in size and she has pain/discomfort  She does complain of some muscular pain of her lower back  Denies any radicular pain down her legs  The following portions of the patient's history were reviewed and updated as appropriate:     She  has a past medical history of Obesity and Seizures ([address] )  She  has a past surgical history that includes Abdominal surgery; Gastric bypass; and  section  Her family history includes Autism in her son; Breast cancer in her mother; Cancer in her maternal grandfather and paternal grandmother; Depression in her mother; Diabetes in her paternal grandmother; Glaucoma in her paternal grandmother; Gout in her father; Hypertension in her father and mother  She  reports that she has never smoked  She has never used smokeless tobacco  She reports that she does not currently use alcohol  She reports that she does not use drugs  Current Outpatient Medications   Medication Sig Dispense Refill   • folic acid (FOLVITE) 966 mcg tablet Take 1 mg by mouth     • lamoTRIgine (LaMICtal) 100 mg tablet Take 100 mg by mouth daily 500 mg total/ day     • Multiple Vitamin (multivitamin) tablet Take 1 tablet by mouth daily       No current facility-administered medications for this visit  She has No Known Allergies       Review of Systems   All other systems reviewed and are negative          Objective:      /88   Pulse 86   Ht 5' 5" (1 651 m) [facility] Nursing Home f/u Visit Note     Date of Service: 12/31/2019  Location seen at: [facility]  Subacute / Skilled Need: Rehabilitation    PCP: Ramya Diaz MD   Patient Care Team:  Ramya Diaz MD as PCP - Nicola Estrada MD as [address] [place] [address] Provider: Physician (Internal Medicine)  Lissa Foley CNP as [address] [place] [address] Provider: Wallace Hutchins (Nurse Practitioner- Becky Herrera)  Nathanael Chauhan MD as Referring Provider (Cardiovascular Disease)  Kory Gutierrez MD as Referring Provider (Nephrology)  Enrrique Looney MD as Referring Provider (Urology)  Bradley Duran MD as Referring Provider (Urology)  Attending SNF MD: Leigh Ann Barahona  Seen by Montse Roman MD today    Hilda Calixto is a 80year old male presenting to [address] [place] [address] for: f/u diagnosis weakness  . 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 Patient had a CT head which showed no evidence of acute intracranial pathology. Said only suggestive of chronic microvascular ischemic changes. Stroke alert was called in the hospital and patient was admitted for possible TIA. Neurology was consulted MRA of the head and neck were done on 12/8 which showed an acute new infarct in the left prefrontal cortex, right internal capsule and left temporal lobe possibly due to an embolic event. Patient underwent an echo on 12/9 which showed an EF of 50 to 55%, AV severe stenosis. Patient was continued on digoxin atorvastatin and anticoagulated with Eliquis.   While in the hospital patient did have some acute urinary retention and had a Kapadia placed has a follow-up appointment with Wt 122 kg (270 lb)   BMI 44 93 kg/m²          Physical Exam  Constitutional:       Appearance: She is obese  HENT:      Head: Atraumatic  Mouth/Throat:      Mouth: Mucous membranes are moist    Eyes:      Extraocular Movements: Extraocular movements intact  Cardiovascular:      Rate and Rhythm: Normal rate  Pulmonary:      Effort: Pulmonary effort is normal    Abdominal:      Palpations: Abdomen is soft  Musculoskeletal:      Cervical back: Normal range of motion  Skin:     General: Skin is warm and dry  Comments: 4 x 6 cm lipomatous mass of the mid back (just left of midline)  No skin changes  Neurological:      Mental Status: She is alert         Extremities: No edema urology as outpatient for voiding trials in 1 week. Patient was also started on Flomax. Once stabilized patient was transferred to transitional care of [facility] for rehab. Patient seen and examined today sitting comfortably in chair does appear to be very slow to respond with extreme generalized weakness 2 person assist to stand with using a walker. Does also complain of weakness and exertional dyspnea. Currently Kapadia in place draining clear urine has an appointment with urology for voiding trials on 12/20. Patient also noted to have poor appetite status post IV fluids 0.9 500mL. He on calorie count will continue to monitor. Per therapy update patient ambulating 50 feet with rolling walker min assist transfers min assist bed mobility min assist toileting mod upper by dressing min lower body max. As per patient he lives at home with his wife Michael Cho uses a cane and a walker at home. Has 12 stairs and is able to go up and down the stairs. Is independent with his ADLs wife helps with housework. CODE STATUS discussed wishes to remain full code wife is his POA. Depression screen done denies any depressive symptoms to return home with home health to continue therapy once ready. 12/19  Patient seen and examined walking with therapy wife at bedside denies any pain or discomfort denies any nausea vomiting diarrhea or constipation patient status post 1.5 L of IV fluids patient's lisinopril was also decreased to 2.5 and patient was started on amlodipine due to worsening renal function. Today patient's renal function improved back to 1.91. We will continue to monitor. Discussed with wife continue to encourage p.o. intake. Kapadia in place draining clear urine does have a urology appointment for voiding trials on 12/20. Patient continues to have poor oral intake on calorie count.   Discussed with wife about discharge planning recommend 24-hour supervision at home versus placement wife to meet with  to discuss options. Patient ambulating 50 feet rolling walker contact-guard stairs not tested min assist for transfers min for bed mobility mod for toileting upper body dressing min lower body mod continues to be confused as per therapist patient with poor memory and safety awareness. 12/24  Patient seen and examined lying in bed appears to be more lethargic than before slightly more confused. As per therapy patient was orthostatic earlier has been difficulty with the participating in therapy feels very fatigued and winded quickly. On exam lungs clear to auscultation patient does appear slightly dry abdomen distended and tender in the suprapubic area. Bladder scan done at bedside with 600mL  We will repeat stat labs. We will also start on IV fluids normal saline. Will have Kapadia reinserted. As per therapy updates patient ambulating 150 feet rolling walker contact-guard stairs 4-7 stairs does get fatigued easily transfers contact-guard bed mobility standby toileting mod to max assist upper body dressing standby lower body mod assist did discuss with wife last visit recommending 24-hour care versus long-term care placement at that time. Family was agreeable wife to work with . Will have  follow-up  And will continue to require 24-hour supervision due to cognition    12/26  Patient seen and examined sitting comfortably in chair remains slow to respond and confused at baseline. As per nursing patient has been constantly refusing bladder scans and gets very agitated when attempted to try. Also according to nurse patient has been urinating frequently abdomen on exam benign and tender. We will continue to monitor output. Patient had a UA done which was negative. Status post IV fluids. Labs today showed renal function has been fluctuating between 2 and 1.9. We will continue to monitor. Patient's medication reviewed and adjusted according to renal function.   As per therapy updates patient ambulating 96 feet rolling walker going up and down 12 steps contact-guard still transfers standby bed mobility standby toileting mod assist upper body dressing standby lower body mod assist will recommend caregiver service versus placement. 12/31  Seen and examined lying in bed appears to be more alert and awake and in good spirits denies any complaints all review of systems negative eating well sleeping well. Patient has a video swallow test 1/2will follow results therapy updates patient ambulating rolling walker standby assist 38 to 73feet going up and down 12 steps contact-guard transfers standby bed mobility standby toileting min assist upper body and lower body dressing standby recommending 24-hour care at home versus long-term care placement patient is safe to transition to the next level of care with therapy 3 times a week with home health. Patient will continue to require 24-hour assistance due to cognitive deficit and poor safety awareness. Durga Flavin HISTORY  Past Medical History:   Diagnosis Date   â¢ AAA (abdominal aortic aneurysm) (CMS/[facility])     >5 cm diameter, repeat CT abdomen and pelvis 2/8/2018 showed interval expansion to 5.7 x 4.9 x 4.4 cm. â¢ Atrial flutter (CMS/[facility])    â¢ Carotid stenosis     48% LICA, <81% DESTINY. 11/85/57   â¢ CVA (cerebral vascular accident) (CMS/[facility])     with cessation of anticoagulation, going to rehab. â¢ DM (diabetes mellitus), type 2 (CMS/[facility])    â¢ H/O cardiovascular stress test 03/26/2019    [address] Perfusion results: normal pattern of perfusion. â¢ H/O echocardiogram 04/14/2018    [address] Valves: mild-moderate AS with Peak Gradient 33 mm Hg. Mean Gradient 19 mm Hg, mild TR. Wall motion: no abnormality seen. Ejection fraction: approximately 65%. Pulmonary Artery (PA) pressure: approximately 35-40 mm Hg.     â¢ Hyperglycemia    â¢ Hypertension    â¢ Mild CAD    â¢ Paroxysmal A-fib (CMS/[facility])     CHADs-VASc score 5.      reports that he quit smoking about 50 years ago. He has a 15.00 pack-year smoking history. He has never used smokeless tobacco. He reports current alcohol use of about 1.0 standard drinks of alcohol per week. He reports that he does not use drugs. Past Surgical History:   Procedure Laterality Date   â¢ Abdominal aortic aneurysm repair, endovascular  09/28/2018   â¢ Ablation - atrial flutter     â¢ Adenoidectomy     â¢ Anes nephrectomy incl part ureterectomy  07/31/2017    Robotic right nephrectomy and right ureterectomy    â¢ Endarterectomy      Carotid, right   â¢ Hernia repair     â¢ Tonsillectomy       Family History   Problem Relation Age of Onset   â¢ Patient is unaware of any medical problems Mother         no known CAD   â¢ Patient is unaware of any medical problems Father         no bnown CAD     History     Not marked as reviewed during this visit.           PROBLEM LIST:  Specialty Problems     None           ADVANCE DIRECTIVES:  Darell Dukes     AD Type [facility])     Value   User Date/Time Recorded    Power of  for [facility] 12/14/2019 12:54:28    Power of  for [facility] 12/11/2019 15:34:57          Alternate Agent Name ([facility]#[id number])     Value   User Date/Time Recorded    Claudetta Lapping 12/14/2019 12:54:28          Alternate Agent Number ([id number])     Value   User Date/Time Recorded    [phone]  [facility] 12/14/2019 12:54:28          Decision Maker ([facility]#650)     Value   User Date/Time Recorded    Self  Mau Diaz 12/11/2019 15:34:57          Do you have an AD? ([facility]#[id number])     Value   User Date/Time Recorded    Yes  Mau Diaz 12/11/2019 15:34:57          LIVING WILL LOCATION OF DOCUMENT ([facility]#[id number])     Value   User Date/Time Recorded    Scanned into Medical record  Adry Coral 12/14/2019 12:54:28          LW Date Received ([facility]#700)     Value   User Date/Time Recorded    12/14/2019  [facility] 12/14/2019 12:54:28          LW Date Signed ([facility]#157)     Value User Date/Time Recorded    7/29/2018  Bettina Hurt 12/14/2019 12:54:28          [facility] Date Received ([facility]#703)     Value   User Date/Time Recorded    12/14/2019  Bettina Hurt 12/14/2019 12:54:28          [facility] Date Signed ([facility]#702)     Value   User Date/Time Recorded    9/27/2018  Bettinakb Hurt 12/14/2019 12:54:28          [address] (#[id number])     Value   User Date/Time Recorded    Scanned into Medical record  Bettinakb Hurt 12/14/2019 12:54:28    Copy placed on paper chart [facility] SURGICAL AND CARDIOVASCULAR [facility] only)  Keyur Jones 12/11/2019 15:34:57          Primary Agent Name ([facility]#660)     Value   User Date/Time Recorded    Carla Madsen 12/14/2019 12:54:28          Primary Agent Number ([facility]#[id number])     Value   User Date/Time Recorded    [phone]  Bettinakb Hurt 12/14/2019 12:54:28              Power of  Status:  Activated  Code Status:  FULL CODE  Goals of Care: return home   Advanced Directive Forms: discussed and on file       DEPRESSION SCREENING:  PHQ 2/9 Test Results  0: Not at all  1: Several days  2: More than half the days  3: Nearly every day      DEPRESSION ASSESSMENT/PLAN:  Depression screening is negative no further plan needed. ALLERGIES:  Allergies as of 12/31/2019   â¢ (No Known Allergies)       CURRENT Home  MEDICATIONS:   Current Outpatient Medications   Medication Sig Dispense Refill   â¢ amLODIPine (NORVASC) 5 MG tablet Take 2.5 mg by mouth daily. â¢ tamsulosin (FLOMAX) 0.4 MG Cap Take 1 capsule by mouth daily after a meal. 30 capsule 0   â¢ digoxin (LANOXIN) 0.125 MG tablet TAKE 1 TABLET DAILY (Patient taking differently: Take 125 mcg by mouth daily. ) 90 tablet 0   â¢ pantoprazole (PROTONIX) 40 MG tablet Take 1 tablet by mouth daily. 90 tablet 2   â¢ apixaBAN (ELIQUIS) 2.5 MG Tab Take 1 tablet by mouth every 12 hours.  180 tablet 2   â¢ atorvastatin (LIPITOR) 10 MG tablet TAKE 1 TABLET DAILY (Patient taking differently: Take 10 mg by mouth nightly. ) 90 tablet 0   â¢ glipiZIDE (GLUCOTROL) 2.5 MG 24 hr tablet TAKE 1 TABLET DAILY (Patient taking differently: Take 2.5 mg by mouth daily. ) 90 tablet 0   â¢ amitriptyline (ELAVIL) 10 MG tablet 2 p.o. nightly (Patient taking differently: Take 20 mg by mouth nightly. ) 180 tablet 1   â¢ Ascorbic Acid (VITAMIN C) 500 MG tablet Take 500 mg by mouth daily. â¢ ferrous sulfate 325 (65 FE) MG tablet Take 325 mg by mouth 2 times daily. â¢ metoPROLOL succinate (TOPROL-XL) 50 MG 24 hr tablet Take 50 mg by mouth daily. â¢ ONETOUCH VERIO test strip TESTING ONCE DAILY AS DIRECTED. 100 strip 0     No current facility-administered medications for this visit. CURRENT SNF MEDICATION:  See above     BASELINE FUNCTIONAL STATUS:  Walker and cane , ind with adls able to go up and down the stairs     CURRENT FUNCTIONAL STATUS:  Weight bearing as tolerated (WBAT)    DIET:  Consistency: General   Type: cardiac diet  Appetite: Poor    REVIEW OF SYSTEMS:  Review of Systems   Constitutional: Positive for fatigue. Negative for activity change, appetite change, chills, fever and unexpected weight change. HENT: Negative for congestion, dental problem, drooling, ear discharge, ear pain, facial swelling, hearing loss, mouth sores, nosebleeds, postnasal drip, rhinorrhea, sinus pressure, sinus pain, sneezing, sore throat, tinnitus, trouble swallowing and voice change. Eyes: Negative for photophobia, pain, discharge, redness, itching and visual disturbance. Respiratory: Negative for apnea, cough, choking, chest tightness, shortness of breath, wheezing and stridor. Cardiovascular: Negative for chest pain, palpitations and leg swelling. Exertional dyspnea   Gastrointestinal: Negative for abdominal distention, abdominal pain, anal bleeding, blood in stool, constipation, diarrhea, nausea, rectal pain and vomiting. Endocrine: Negative for cold intolerance, heat intolerance, polydipsia, polyphagia and polyuria.    Genitourinary: Negative for decreased urine volume, difficulty urinating, discharge, dysuria, enuresis, flank pain, frequency, genital sores, hematuria, penile pain, penile swelling, scrotal swelling, testicular pain and urgency. Musculoskeletal: Negative for arthralgias, back pain, gait problem, joint swelling, myalgias, neck pain and neck stiffness. Skin: Negative for color change, pallor, rash and wound. Allergic/Immunologic: Negative for environmental allergies and food allergies. Neurological: Positive for weakness. Negative for dizziness, tremors, seizures, syncope, facial asymmetry, speech difficulty, light-headedness, numbness and headaches. Hematological: Negative for adenopathy. Does not bruise/bleed easily. Psychiatric/Behavioral: Positive for confusion. Negative for agitation, behavioral problems, decreased concentration, dysphoric mood, hallucinations, self-injury, sleep disturbance and suicidal ideas. The patient is not nervous/anxious and is not hyperactive. PHYSICAL ASSESSMENT:  Physical Exam  Vitals signs and nursing note reviewed. Constitutional:       General: He is not in acute distress. Appearance: He is well-developed. He is not diaphoretic. HENT:      Head: Normocephalic and atraumatic. Right Ear: External ear normal.      Left Ear: External ear normal.      Nose: Nose normal.      Mouth/Throat:      Pharynx: No oropharyngeal exudate. Eyes:      General: No scleral icterus. Right eye: No discharge. Left eye: No discharge. Conjunctiva/sclera: Conjunctivae normal.      Pupils: Pupils are equal, round, and reactive to light. Neck:      Musculoskeletal: Normal range of motion and neck supple. Thyroid: No thyromegaly. Vascular: No JVD. Cardiovascular:      Rate and Rhythm: Normal rate. Rhythm irregular. Heart sounds: Normal heart sounds. No murmur. No friction rub. No gallop. Pulmonary:      Effort: Pulmonary effort is normal. No respiratory distress. Breath sounds: Normal breath sounds. No stridor. No wheezing or rales. Chest:      Chest wall: No tenderness. Abdominal:      General: Bowel sounds are normal. There is no distension. Palpations: Abdomen is soft. There is no mass. Tenderness: There is no tenderness. There is no guarding or rebound. Hernia: No hernia is present. There is no hernia in the right inguinal area or left inguinal area. Comments: Abdomen soft nd,nt bs +   Genitourinary:     Penis: Normal. No erythema, tenderness or discharge. Scrotum/Testes: Normal.         Right: Mass, tenderness or swelling not present. Left: Mass, tenderness or swelling not present. Rectum: Normal.   Musculoskeletal: Normal range of motion. Comments: Generalized weakness  DPTs contracture left hand  Power 4 x 5 bilateral upper extremities and 3 to 4 x 5 lle and 4/5 rt  lower extremities   Lymphadenopathy:      Cervical: No cervical adenopathy. Lower Body: No right inguinal adenopathy. No left inguinal adenopathy. Skin:     General: Skin is warm and dry. Coloration: Skin is not pale. Findings: No erythema or rash. Neurological:      General: No focal deficit present. Mental Status: He is alert. Mental status is at baseline. He is disoriented. Cranial Nerves: No cranial nerve deficit. Sensory: No sensory deficit. Motor: No abnormal muscle tone. Coordination: Coordination normal.      Deep Tendon Reflexes: Reflexes normal.      Comments: Very slow to respond with slow movements  lle weakness   Psychiatric:         Speech: Speech normal.         Behavior: Behavior normal.         Thought Content:  Thought content normal.         Judgment: Judgment normal.      Comments: Flat and withdrawn affect is slow to respond         VITALS:  126/75, 97.7, 80, weight 173.2, 18, blood glucose 119, 97%, 0 out of 10 pain  109/64, 96.9, 70, weight 173.2, 18, blood glucose 102, 96%, 3 out of 10 pain  109/69, 96.7, 79, weight 171.8, 20, blood glucose 119, 100% on room air, 0 out of 10 pain  147/90, 96.4, 77, weight 171.8, 18, blood glucose 117, 99%, 0 out of 10 pain  125/75, 97, 73, weight 171.8, 18, blood glucose 148, O2 96%, 0 out of 10 pain    LABS:  Sodium 144 potassium 4.3 chloride 110 bicarb 26 BUN 31 creatinine 1.83 glucose 78 TSH 1.458 WBC 7.8 hemoglobin 12.7 hematocrit 38.7 platelets 276 phosphorus 3.9 magnesium 1.9  144 sodium potassium 4.6 chloride 111 bicarb 25 BUN 31 creatinine 2.09 glucose 74  WBC 8.7 hemoglobin 12.8 hematocrit 38.8 platelets 439 magnesium 2.1 phosphorus 3.8 sodium 143 potassium 4.6 chloride 108 bicarb 27 BUN 35 creatinine 2 glucose 89    WBC 8.6 hemoglobin 13.6 hematocrit 40.4 platelets 9/2/6547 phosphorus 3.8 magnesium 2 sodium 143 potassium 4.4 chloride 108 bicarb 25 BUN 32 creatinine 1.91 from 2.04 from 1.93 on admission  WBC 8.6 hemoglobin 13.6 hematocrit 40.4 platelets 374 phosphorus 3.8 magnesium 2 digoxin 1.43 sodium 143 potassium 4.3 chloride 106 bicarb 26 BUN 31 creatinine 1.93 glucose 109    ASSESSMENT AND PLAN  Assessment   No problem-specific Assessment & Plan notes found for this encounter.   Volume depletion  Patient status post IV fluids  Today appears better orthostatics resolved  Continue to monitor and encourage p.o. fluid    Urinary retention  Patient's Kapadia was removed on the 20th  She has been urinating without any issues  Continue patient on Flomax twice daily  Follow-up with urology as outpatient     Lethargy weakness  Is to be more alert and awake today  Continue to monitor    Acute CVA involving the left prefrontal cortex, right internal capsule and left temporal lobe  Thought to be secondary to embolic event  With history of previous CVA  Echo done with EF of 50 to 55% with severe AV stenosis  Continue anticoagulation with Eliquis  Continue atorvastatin  Continue PT OT and speech therapy see above for updates   Follow-up with neurology as outpatient  Continue supportive care  Discussed with wife about requiring 24-hour supervision at home versus placement wife to meet with  to discuss options      DALIA  Has improved status post IV fluids  Continue to monitor  Avoid nephrotoxic medications      Atrial fibrillation status post watchman device  Continue to be rate controlled  Continue digoxin  Continue Eliquis  Follow-up with cardiology as outpatient  stable    PAD  Continue medical management with statins and metoprolol  Currently denies any symptoms  Follow-up with cardiology  Stable     Carotid stenosis  Currently asymptomatic  We will continue to monitor    History of AAA with endograft repair  Currently stable    Transitional cell carcinoma status post right nephrectomy  Currently without any issues  Follow-up with Dr. Ryland Barney as outpatient          GI DVT prophylaxis with PPI and Eliquis    History of tobacco dependence  Continue to encourage abstinence      Diabetes  Refusing Accu-Cheks 4 times daily  We will change to daily  Continue glipizide  Patient blood glucose well controlled range 10 2-2 54   Continue glipizide  Patient hemoglobin A1c in the hospital was 6.7  stable    Hypertension  Vitals every shift  Blood pressure better controlled  Continue to monitor  FOLLOW UP APPOINTMENTS:  Follow-up with urology 12/20  Follow-up with PCP upon discharge  Follow-up with neurology  Follow-up with cardiology  Follow-up with Dr. Ryland Barney as outpatient    DISCHARGE PLANNING: Home health to continue therapy once ready for discharge  Discussed with wife patient will require 24-hour assistance at home to work with  for possible caregiver services versus long-term care placement    Discussed with: RN / Nursing, Patient and Reviewed old records    Prognosis: fair    Total time spent is more than 45 minutes, with more than 50% of the time spent in coordination of care, counseling, review of records and discussion of plan of care with the patient [place].

## 2023-01-04 NOTE — PRE-PROCEDURE INSTRUCTIONS
Pre-Surgery Instructions:   Medication Instructions   • folic acid (FOLVITE) 125 mcg tablet Last dose 1/4   • lamoTRIgine (LaMICtal) 100 mg tablet Take day of surgery  • Multiple Vitamin (multivitamin) tablet Last dose 1/4     Spoke with pt via phone  Advised hospital location will call with arrival time night before surgery  NPO after midnight the night before surgery, including chewing gum and hard candy  A small sip of water is allowed to take approved medications the morning of surgery  Non-vaccinated patients and visitors need to remain masked at all times while in the hospital     Instructed to leave contacts/jewelery/valuables at home  Ok to wear dentures, glasses and hearing aides into the hospital - they will be removed for surgery  No smoking or alcohol consumption 24 hours prior to surgery  Avoid all Aspirin products and OTC Vit/Supp 1 week prior to surgery and avoid NSAIDs 3 days prior to surgery per anesthesia instructions  Tylenol ok to take prn  Showering instructions reviewed for night before and morning of surgery using surgical soap or antibacterial soap  Patient verbalized understanding of all of the above, including medication instructions

## 2023-01-10 ENCOUNTER — ANESTHESIA EVENT (OUTPATIENT)
Dept: PERIOP | Facility: HOSPITAL | Age: 38
End: 2023-01-10

## 2023-01-10 ENCOUNTER — HOSPITAL ENCOUNTER (OUTPATIENT)
Facility: HOSPITAL | Age: 38
Setting detail: OUTPATIENT SURGERY
Discharge: HOME | End: 2023-01-10
Attending: SURGERY | Admitting: SURGERY

## 2023-01-10 ENCOUNTER — ANESTHESIA (OUTPATIENT)
Dept: PERIOP | Facility: HOSPITAL | Age: 38
End: 2023-01-10

## 2023-01-10 VITALS
DIASTOLIC BLOOD PRESSURE: 70 MMHG | OXYGEN SATURATION: 98 % | RESPIRATION RATE: 16 BRPM | WEIGHT: 270 LBS | TEMPERATURE: 97.2 F | HEIGHT: 65 IN | BODY MASS INDEX: 44.98 KG/M2 | HEART RATE: 57 BPM | SYSTOLIC BLOOD PRESSURE: 116 MMHG

## 2023-01-10 DIAGNOSIS — R22.2 MASS ON BACK: Primary | ICD-10-CM

## 2023-01-10 PROBLEM — R56.9 SEIZURES (HCC): Status: ACTIVE | Noted: 2023-01-10

## 2023-01-10 LAB
EXT PREGNANCY TEST URINE: NEGATIVE
EXT. CONTROL: NORMAL

## 2023-01-10 RX ORDER — SODIUM CHLORIDE, SODIUM LACTATE, POTASSIUM CHLORIDE, CALCIUM CHLORIDE 600; 310; 30; 20 MG/100ML; MG/100ML; MG/100ML; MG/100ML
INJECTION, SOLUTION INTRAVENOUS CONTINUOUS PRN
Status: DISCONTINUED | OUTPATIENT
Start: 2023-01-10 | End: 2023-01-10

## 2023-01-10 RX ORDER — ONDANSETRON 2 MG/ML
4 INJECTION INTRAMUSCULAR; INTRAVENOUS EVERY 4 HOURS PRN
Status: DISCONTINUED | OUTPATIENT
Start: 2023-01-10 | End: 2023-01-10 | Stop reason: HOSPADM

## 2023-01-10 RX ORDER — ONDANSETRON 2 MG/ML
4 INJECTION INTRAMUSCULAR; INTRAVENOUS ONCE AS NEEDED
Status: DISCONTINUED | OUTPATIENT
Start: 2023-01-10 | End: 2023-01-10 | Stop reason: HOSPADM

## 2023-01-10 RX ORDER — OXYCODONE HYDROCHLORIDE 5 MG/1
5 TABLET ORAL EVERY 4 HOURS PRN
Qty: 10 TABLET | Refills: 0 | Status: SHIPPED | OUTPATIENT
Start: 2023-01-10 | End: 2023-01-20

## 2023-01-10 RX ORDER — PROPOFOL 10 MG/ML
INJECTION, EMULSION INTRAVENOUS CONTINUOUS PRN
Status: DISCONTINUED | OUTPATIENT
Start: 2023-01-10 | End: 2023-01-10

## 2023-01-10 RX ORDER — OXYCODONE HYDROCHLORIDE 5 MG/1
5 TABLET ORAL EVERY 4 HOURS PRN
Status: DISCONTINUED | OUTPATIENT
Start: 2023-01-10 | End: 2023-01-10 | Stop reason: HOSPADM

## 2023-01-10 RX ORDER — ALBUTEROL SULFATE 2.5 MG/3ML
2.5 SOLUTION RESPIRATORY (INHALATION) ONCE AS NEEDED
Status: DISCONTINUED | OUTPATIENT
Start: 2023-01-10 | End: 2023-01-10 | Stop reason: HOSPADM

## 2023-01-10 RX ORDER — SODIUM CHLORIDE, SODIUM LACTATE, POTASSIUM CHLORIDE, CALCIUM CHLORIDE 600; 310; 30; 20 MG/100ML; MG/100ML; MG/100ML; MG/100ML
125 INJECTION, SOLUTION INTRAVENOUS CONTINUOUS
Status: DISCONTINUED | OUTPATIENT
Start: 2023-01-10 | End: 2023-01-10 | Stop reason: HOSPADM

## 2023-01-10 RX ORDER — ONDANSETRON 2 MG/ML
INJECTION INTRAMUSCULAR; INTRAVENOUS AS NEEDED
Status: DISCONTINUED | OUTPATIENT
Start: 2023-01-10 | End: 2023-01-10

## 2023-01-10 RX ORDER — FENTANYL CITRATE/PF 50 MCG/ML
25 SYRINGE (ML) INJECTION
Status: DISCONTINUED | OUTPATIENT
Start: 2023-01-10 | End: 2023-01-10 | Stop reason: HOSPADM

## 2023-01-10 RX ORDER — MIDAZOLAM HYDROCHLORIDE 2 MG/2ML
INJECTION, SOLUTION INTRAMUSCULAR; INTRAVENOUS AS NEEDED
Status: DISCONTINUED | OUTPATIENT
Start: 2023-01-10 | End: 2023-01-10

## 2023-01-10 RX ORDER — BUPIVACAINE HYDROCHLORIDE AND EPINEPHRINE 2.5; 5 MG/ML; UG/ML
INJECTION, SOLUTION EPIDURAL; INFILTRATION; INTRACAUDAL; PERINEURAL AS NEEDED
Status: DISCONTINUED | OUTPATIENT
Start: 2023-01-10 | End: 2023-01-10 | Stop reason: HOSPADM

## 2023-01-10 RX ORDER — FENTANYL CITRATE 50 UG/ML
INJECTION, SOLUTION INTRAMUSCULAR; INTRAVENOUS AS NEEDED
Status: DISCONTINUED | OUTPATIENT
Start: 2023-01-10 | End: 2023-01-10

## 2023-01-10 RX ORDER — HYDROMORPHONE HCL IN WATER/PF 6 MG/30 ML
0.2 PATIENT CONTROLLED ANALGESIA SYRINGE INTRAVENOUS
Status: DISCONTINUED | OUTPATIENT
Start: 2023-01-10 | End: 2023-01-10 | Stop reason: HOSPADM

## 2023-01-10 RX ORDER — HYDROMORPHONE HCL/PF 1 MG/ML
0.5 SYRINGE (ML) INJECTION
Status: DISCONTINUED | OUTPATIENT
Start: 2023-01-10 | End: 2023-01-10 | Stop reason: HOSPADM

## 2023-01-10 RX ADMIN — MIDAZOLAM 2 MG: 1 INJECTION INTRAMUSCULAR; INTRAVENOUS at 10:08

## 2023-01-10 RX ADMIN — SODIUM CHLORIDE, SODIUM LACTATE, POTASSIUM CHLORIDE, AND CALCIUM CHLORIDE: .6; .31; .03; .02 INJECTION, SOLUTION INTRAVENOUS at 10:08

## 2023-01-10 RX ADMIN — CEFAZOLIN 3000 MG: 10 INJECTION, POWDER, FOR SOLUTION INTRAVENOUS at 10:08

## 2023-01-10 RX ADMIN — PROPOFOL 60 MCG/KG/MIN: 10 INJECTION, EMULSION INTRAVENOUS at 10:18

## 2023-01-10 RX ADMIN — ONDANSETRON 4 MG: 2 INJECTION INTRAMUSCULAR; INTRAVENOUS at 10:27

## 2023-01-10 RX ADMIN — FENTANYL CITRATE 50 MCG: 50 INJECTION INTRAMUSCULAR; INTRAVENOUS at 10:20

## 2023-01-10 RX ADMIN — MIDAZOLAM 2 MG: 1 INJECTION INTRAMUSCULAR; INTRAVENOUS at 09:50

## 2023-01-10 RX ADMIN — FENTANYL CITRATE 50 MCG: 50 INJECTION INTRAMUSCULAR; INTRAVENOUS at 10:12

## 2023-01-10 NOTE — ANESTHESIA PREPROCEDURE EVALUATION
Procedure:  EXCISION  BIOPSY LESION/MASS BACK (Back)    Relevant Problems   NEURO/PSYCH   (+) Seizures (HCC)     Last seizure >10 years ago  Morbid obesity  S/p bariatric surgery     Physical Exam    Airway    Mallampati score: II  TM Distance: >3 FB  Neck ROM: full     Dental   Comment: Denies loose teeth,     Cardiovascular  Cardiovascular exam normal    Pulmonary  Pulmonary exam normal     Other Findings  Portions of exam deferred due to low yield and/or unknown COVID status      Anesthesia Plan  ASA Score- 2     Anesthesia Type- IV sedation with anesthesia with ASA Monitors  Additional Monitors:   Airway Plan:           Plan Factors-Exercise tolerance (METS): >4 METS  Chart reviewed  Existing labs reviewed  Patient summary reviewed  Patient is not a current smoker  Induction- intravenous  Postoperative Plan-     Informed Consent- Anesthetic plan and risks discussed with patient  I personally reviewed this patient with the CRNA  Discussed and agreed on the Anesthesia Plan with the CRNA  Elizabeth Hendricks          discussed GA as backup

## 2023-01-10 NOTE — OP NOTE
OPERATIVE REPORT  PATIENT NAME: Mei Cosme    :  1985  MRN: [id number]  Pt Location: [facility] OR ROOM 03    SURGERY DATE: 1/10/2023    Surgeon(s) and Role:     * Fran Samuels DO - Primary     * Bisi Kearney MD - Assisting    Preop Diagnosis:  Mass on back [R22 2]    Post-Op Diagnosis Codes:     * Mass on back [R22 2]    Procedure(s) (LRB):  EXCISION  BIOPSY OF LESION/MASS BACK (N/A)    Specimen(s):  ID Type Source Tests Collected by Time Destination   1 : Back lipoma mass Tissue Soft Tissue, Lipoma TISSUE EXAM Fran Samuels DO 1/10/2023 1026        Estimated Blood Loss:   Minimal    Drains:  * No LDAs found *    Anesthesia Type:   IV Sedation with Anesthesia    Operative Indications: Mass on back [R22 2]      Operative Findings:  5 x 5 cm deep subcutaneous lipomatous mass  Removed without any specific margin  ASA 2  Wound class I  Height 65 inches weight 122 kg/270 pounds  BMI 45    Complications:   None    Procedure and Technique:  Patient was brought the operative suite and identified by visualization, conversation, by armband  Sequential compression pumps were placed  She was then placed with her right side down to help expose her lower back area  This area is prepped and draped in a sterile fashion  Timeout was performed was assured that the prep was dry  Local was instilled in a horizontal fashion over the palpable mass  Skin incision was made and the underlying subcutaneous tissue was divided with hot cautery down to the lipomatous mass  This was carefully dissected out from the surrounding tissues  Few bleeding points were controlled with cautery  This is essentially sitting on top of the back musculature without invasion of the musculature  Once hemostasis was assured irrigations carried out  More local was instilled  2-0 Vicryl was used to close subcutaneous tissue  Skin was closing 4-0 Monocryl in a subcuticular fashion  Wounds washed and dried    Sterile skin glue was applied  She was awakened in the operating room and returned to the recovery area in stable condition having tolerated the procedure well     I was present for the entire procedure    Patient Disposition:  PACU         SIGNATURE: Karla Sandoval DO  DATE: January 10, 2023  TIME: 10:34 AM

## 2023-01-10 NOTE — DISCHARGE INSTR - AVS FIRST PAGE
Apply ice as needed to wound    Attempt to use Tylenol or ibuprofen as needed for pain    Activity as tolerated  Expected soreness to be noted    May shower starting 1/11/2023    Please call with questions or concerns    [phone]

## 2023-01-10 NOTE — INTERVAL H&P NOTE
H&P reviewed  After examining the patient I find no changes in the patients condition since the H&P had been written      Vitals:    01/10/23 0843   BP: (S) 127/90   Pulse: (S) 87   Resp: (S) 20   Temp: (!) 97 3 °F (36 3 °C)   SpO2: (S) 96%

## 2023-01-10 NOTE — ANESTHESIA POSTPROCEDURE EVALUATION
Post-Op Assessment Note    CV Status:  Stable  Pain Score: 0    Pain management: adequate     Mental Status:  Awake and alert   Hydration Status:  Stable   PONV Controlled:  None   Airway Patency:  Patent      Post Op Vitals Reviewed: Yes      Staff: CRNA         No notable events documented      /69 (01/10/23 1100)    Temp 97 9 °F (36 6 °C) (01/10/23 1053)    Pulse 66 (01/10/23 1100)   Resp 15 (01/10/23 1100)    SpO2 98 % (01/10/23 1100)

## 2023-02-20 ENCOUNTER — OFFICE VISIT (OUTPATIENT)
Dept: SURGERY | Facility: CLINIC | Age: 38
End: 2023-02-20

## 2023-02-20 DIAGNOSIS — Z48.89 POSTOPERATIVE VISIT: Primary | ICD-10-CM

## 2023-02-20 NOTE — PROGRESS NOTES
Assessment/Plan: Patient is status post excision of a lipomatous mass from the left lower back  Pathology was benign  This consistent with lipoma  She returns today for follow-up visit  She feels well  Incision is clean and healing well  All questions answered  There are no diagnoses linked to this encounter  Pathology: Reviewed with patient, all questions answered  Postoperative restrictions reviewed  All questions answered  ______________________________________________________  HPI: Patient presents post operatively  Excision biopsy of lesion/mass back 1/10/2023   Final Diagnosis  A  Soft Tissue, Back:  - Mature fibroadipose tissue consistent with lipoma with foci of fat necrosis                   ROS:  General ROS: negative for - chills, fatigue, fever or night sweats, weight loss  Respiratory ROS: no cough, shortness of breath, or wheezing  Cardiovascular ROS: no chest pain or dyspnea on exertion  Genito-Urinary ROS: no dysuria, trouble voiding, or hematuria  Musculoskeletal ROS: negative for - gait disturbance, joint pain or muscle pain  Neurological ROS: no TIA or stroke symptoms  GI ROS: see HPI  Skin ROS: no new rashes or lesions   Lymphatic ROS: no new adenopathy noted by pt  GYN ROS: see HPI, no new GYN history or bleeding noted  Psy ROS: no new mental or behavioral disturbances         Patient Active Problem List   Diagnosis   • Postpartum care following  delivery   • Previous  section   • Rectus sheath hematoma   • Seizures (HCC)       Allergies:  Patient has no known allergies        Current Outpatient Medications:   •  folic acid (FOLVITE) 305 mcg tablet, Take 1 mg by mouth, Disp: , Rfl:   •  lamoTRIgine (LaMICtal) 100 mg tablet, Take 100 mg by mouth 2 (two) times a day 500 mg total/ day --- 300 mg in the AM and 200mg in PM, Disp: , Rfl:   •  Multiple Vitamin (multivitamin) tablet, Take 1 tablet by mouth daily, Disp: , Rfl:     Past Medical History: Diagnosis Date   • Obesity    • Seizures ([facility] [address] )     last one        Past Surgical History:   Procedure Laterality Date   • ABDOMINAL SURGERY     •  SECTION      x1   • GASTRIC BYPASS     • GA EXCISION TUMOR SOFT TIS BACK/FLANK SUBQ 3 CM/> N/A 1/10/2023    Procedure: EXCISION  BIOPSY OF LESION/MASS BACK;  Surgeon: Yajaira Samuels DO;  Location: [facility];  Service: General       Family History   Problem Relation Age of Onset   • Breast cancer Mother    • Hypertension Mother    • Depression Mother    • Hypertension Father    • Gout Father    • Autism Son    • Cancer Maternal Grandfather    • Cancer Paternal Grandmother    • Diabetes Paternal Grandmother    • Glaucoma Paternal Grandmother         reports that she has never smoked  She has never used smokeless tobacco  She reports that she does not currently use alcohol  She reports that she does not use drugs  PHYSICAL EXAM    There were no vitals taken for this visit      General: normal, cooperative, no distress  Incision: clean, dry, and intact and healing well      Lico Carney MD    Date: 2023 Time: 11:24 AM

## 2023-05-24 ENCOUNTER — RA CDI HCC (OUTPATIENT)
Dept: OTHER | Facility: HOSPITAL | Age: 38
End: 2023-05-24

## 2023-05-24 NOTE — PROGRESS NOTES
[facility]  coding opportunities       Chart reviewed, no opportunity found: CHART REVIEWED, NO OPPORTUNITY FOUND        Patients Insurance        Commercial Insurance: Clint Copeland

## 2024-01-12 ENCOUNTER — TELEPHONE (OUTPATIENT)
Dept: FAMILY MEDICINE CLINIC | Facility: CLINIC | Age: 39
End: 2024-01-12

## 2024-03-20 ENCOUNTER — TELEPHONE (OUTPATIENT)
Dept: OBGYN CLINIC | Facility: CLINIC | Age: 39
End: 2024-03-20

## 2024-03-20 NOTE — TELEPHONE ENCOUNTER
Spoke with patient about her appointment with  today. She understood about it needing to be rescheduled and we did that.

## 2024-03-22 ENCOUNTER — OFFICE VISIT (OUTPATIENT)
Age: 39
End: 2024-03-22
Payer: COMMERCIAL

## 2024-03-22 VITALS
BODY MASS INDEX: 44.98 KG/M2 | DIASTOLIC BLOOD PRESSURE: 82 MMHG | SYSTOLIC BLOOD PRESSURE: 118 MMHG | WEIGHT: 270 LBS | HEIGHT: 65 IN | HEART RATE: 84 BPM

## 2024-03-22 DIAGNOSIS — M72.2 PLANTAR FASCIITIS, LEFT: Primary | ICD-10-CM

## 2024-03-22 PROCEDURE — 99203 OFFICE O/P NEW LOW 30 MIN: CPT | Performed by: STUDENT IN AN ORGANIZED HEALTH CARE EDUCATION/TRAINING PROGRAM

## 2024-03-22 RX ORDER — MELOXICAM 7.5 MG/1
15 TABLET ORAL DAILY
Qty: 30 TABLET | Refills: 0 | Status: SHIPPED | OUTPATIENT
Start: 2024-03-22

## 2024-03-22 RX ORDER — MELOXICAM 7.5 MG/1
7.5 TABLET ORAL DAILY
COMMUNITY
Start: 2024-03-03 | End: 2024-03-22 | Stop reason: SDUPTHER

## 2024-04-19 ENCOUNTER — OFFICE VISIT (OUTPATIENT)
Age: 39
End: 2024-04-19
Payer: COMMERCIAL

## 2024-04-19 VITALS
HEIGHT: 65 IN | SYSTOLIC BLOOD PRESSURE: 124 MMHG | WEIGHT: 270 LBS | DIASTOLIC BLOOD PRESSURE: 84 MMHG | HEART RATE: 80 BPM | BODY MASS INDEX: 44.98 KG/M2

## 2024-04-19 DIAGNOSIS — M72.2 PLANTAR FASCIITIS, LEFT: Primary | ICD-10-CM

## 2024-04-19 PROCEDURE — 20550 NJX 1 TENDON SHEATH/LIGAMENT: CPT | Performed by: STUDENT IN AN ORGANIZED HEALTH CARE EDUCATION/TRAINING PROGRAM

## 2024-04-19 NOTE — PROGRESS NOTES
This patient was seen on  4/19/24 .    My role is Foot , Ankle, and Wound Specialist    ASSESSMENT     Diagnoses and all orders for this visit:    Plantar fasciitis, left  -     Foot/lower extremity injection  -     lidocaine (PF) (XYLOCAINE-MPF) 1 % injection 1 mL  -     dexamethasone (DECADRON) injection 4 mg  -     triamcinolone acetonide (KENALOG-40) 40 mg/mL injection 40 mg           Problem List Items Addressed This Visit          Musculoskeletal and Integument    Plantar fasciitis, left - Primary    Relevant Medications    lidocaine (PF) (XYLOCAINE-MPF) 1 % injection 1 mL (Completed) (Start on 4/21/2024 11:00 PM)    dexamethasone (DECADRON) injection 4 mg (Completed) (Start on 4/21/2024 11:00 PM)    triamcinolone acetonide (KENALOG-40) 40 mg/mL injection 40 mg (Completed) (Start on 4/21/2024 11:00 PM)    Other Relevant Orders    Foot/lower extremity injection       PLAN  -Patient was educated regarding their condition.  -Continue night splints, daily stretching program, again reinforced the importance of proper footwear and orthotics  -Corticosteroid injection given today  -Patient will RTC in 6-weeks    Foot/lower extremity injection    Performed by: Ralf Marcelino DPM  Authorized by: Ralf Marcelino DPM    Procedure:     Other Assisting Provider: No      Verbal consent obtained?: Yes      Risks and benefits: Risks, benefits and alternatives were discussed      Consent given by:  Patient    Time out: Immediately prior to the procedure a time out was called      Patient states understanding of procedure being performed: Yes      Site marked: Yes      Patient identity confirmed:  Verbally with patient    Supporting Documentation:     Indications:  Pain    Procedure Details:                Ethyl Chloride was applied      Needle size: 25 G G    Ultrasound Guidance: no      Approach:  Plantar    Laterality:  Left    Cyst Aspiration/Injection: No      Location: aponeurosis      Aponeurosis Structures: Plantar  "fascia origin      Injection Information:       Patient tolerance:  Patient tolerated the procedure well with no immediate complications        SUBJECTIVE    Chief Complaint:  Left foot pain     Patient ID: Neela Shaikh     3/22/2024: Neela is a pleasant 38-year-old female who presents today with left foot pain.  She states that this been going on for approximately the past 4 weeks.  She states that it is in the heel.  She states that for treatment so far she has gotten different shoes, has used meloxicam, a metal water bottle to roll her foot on and has bought a night splint.  She is also used a wrap as well.  Nothing seems to be taking the pain away completely.  She states the pain is worse first thing in the morning.    April 19, 2024: Neela returns today with continued left foot pain.  She states that this is only about 10 to 20% better.  She states that she has been doing her stretches, however could be more consistent with them.  She states that she would like an injection today to the left foot.        The following portions of the patient's history were reviewed and updated as appropriate: allergies, current medications, past family history, past medical history, past social history, past surgical history and problem list.    Review of Systems   Constitutional: Negative.    HENT: Negative.     Respiratory: Negative.     Cardiovascular: Negative.    Gastrointestinal: Negative.    Musculoskeletal:  Positive for myalgias.   Skin: Negative.    Neurological: Negative.          OBJECTIVE      /84   Pulse 80   Ht 5' 5\" (1.651 m)   Wt 122 kg (270 lb)   BMI 44.93 kg/m²        Physical Exam  Constitutional:       Appearance: Normal appearance.   HENT:      Head: Normocephalic and atraumatic.   Eyes:      General:         Right eye: No discharge.         Left eye: No discharge.   Cardiovascular:      Rate and Rhythm: Normal rate and regular rhythm.      Pulses:           Dorsalis pedis pulses are 2+ on " the right side and 2+ on the left side.        Posterior tibial pulses are 2+ on the right side and 2+ on the left side.   Pulmonary:      Effort: Pulmonary effort is normal.      Breath sounds: Normal breath sounds.   Skin:     General: Skin is warm.      Capillary Refill: Capillary refill takes less than 2 seconds.   Neurological:      Sensory: Sensation is intact. No sensory deficit.         MSK:  -Pain on palpation of medial calcaneal tubercle at the insertion site of the plantar fascia  -no pain with compression of both sides of heel  -No gross deformities noted  -Active range of motion lesser digits intact  -MMT 5/5 to all muscle compartments of the lower extremity  -Ankle dorsiflexion is less than 10 degrees with knee extended, and knee flexed    Derm:  -No lesions, abrasions, or open wounds noted  -No noted interdigital maceration, peeling, malodor  -No areas of callus formation noted on exam  -no erythema, ecchymosis, edema noted     Vascular:  -DP and PT pulses intact b/l  -Capillary refill time <2 sec b/l  -Skin temp: WNL    Neuro:  -Light sensation intact bilaterally  -Protective sensation intact bilaterally  -Tinel sign negative

## 2024-04-21 RX ORDER — LIDOCAINE HYDROCHLORIDE 10 MG/ML
1 INJECTION, SOLUTION EPIDURAL; INFILTRATION; INTRACAUDAL; PERINEURAL ONCE
Status: COMPLETED | OUTPATIENT
Start: 2024-04-21 | End: 2024-04-21

## 2024-04-21 RX ORDER — TRIAMCINOLONE ACETONIDE 40 MG/ML
40 INJECTION, SUSPENSION INTRA-ARTICULAR; INTRAMUSCULAR ONCE
Status: COMPLETED | OUTPATIENT
Start: 2024-04-21 | End: 2024-04-21

## 2024-04-21 RX ORDER — DEXAMETHASONE SODIUM PHOSPHATE 4 MG/ML
4 INJECTION, SOLUTION INTRA-ARTICULAR; INTRALESIONAL; INTRAMUSCULAR; INTRAVENOUS; SOFT TISSUE ONCE
Status: COMPLETED | OUTPATIENT
Start: 2024-04-21 | End: 2024-04-21

## 2024-04-21 RX ADMIN — DEXAMETHASONE SODIUM PHOSPHATE 4 MG: 4 INJECTION, SOLUTION INTRA-ARTICULAR; INTRALESIONAL; INTRAMUSCULAR; INTRAVENOUS; SOFT TISSUE at 22:49

## 2024-04-21 RX ADMIN — TRIAMCINOLONE ACETONIDE 40 MG: 40 INJECTION, SUSPENSION INTRA-ARTICULAR; INTRAMUSCULAR at 22:50

## 2024-04-21 RX ADMIN — LIDOCAINE HYDROCHLORIDE 1 ML: 10 INJECTION, SOLUTION EPIDURAL; INFILTRATION; INTRACAUDAL; PERINEURAL at 22:50

## 2024-05-07 DIAGNOSIS — M72.2 PLANTAR FASCIITIS, LEFT: ICD-10-CM

## 2024-05-07 RX ORDER — MELOXICAM 7.5 MG/1
15 TABLET ORAL DAILY
Qty: 30 TABLET | Refills: 0 | Status: SHIPPED | OUTPATIENT
Start: 2024-05-07

## 2024-05-31 ENCOUNTER — OFFICE VISIT (OUTPATIENT)
Age: 39
End: 2024-05-31
Payer: COMMERCIAL

## 2024-05-31 VITALS
WEIGHT: 270 LBS | HEART RATE: 75 BPM | SYSTOLIC BLOOD PRESSURE: 111 MMHG | BODY MASS INDEX: 44.98 KG/M2 | HEIGHT: 65 IN | DIASTOLIC BLOOD PRESSURE: 77 MMHG

## 2024-05-31 DIAGNOSIS — M72.2 PLANTAR FASCIITIS, LEFT: Primary | ICD-10-CM

## 2024-05-31 PROCEDURE — 99213 OFFICE O/P EST LOW 20 MIN: CPT | Performed by: STUDENT IN AN ORGANIZED HEALTH CARE EDUCATION/TRAINING PROGRAM

## 2024-05-31 RX ORDER — PREDNISONE 10 MG/1
TABLET ORAL
COMMUNITY
Start: 2024-05-31

## 2024-05-31 NOTE — PROGRESS NOTES
This patient was seen on  5/31/24 .    My role is Foot , Ankle, and Wound Specialist    ASSESSMENT     Diagnoses and all orders for this visit:    Plantar fasciitis, left    Other orders  -     predniSONE 10 mg tablet             Problem List Items Addressed This Visit          Musculoskeletal and Integument    Plantar fasciitis, left - Primary         PLAN  -Patient was educated regarding their condition.  -Continue night splints, daily stretching program, again reinforced the importance of proper footwear and orthotics  -Patient will RTC in 8-weeks, we will consider MRI versus physical therapy at this time.  Neela states that physical therapy will be difficult to commit to given her busy home life.        SUBJECTIVE    Chief Complaint:  Left foot pain     Patient ID: Neela Shaikh     3/22/2024: Neela is a pleasant 38-year-old female who presents today with left foot pain.  She states that this been going on for approximately the past 4 weeks.  She states that it is in the heel.  She states that for treatment so far she has gotten different shoes, has used meloxicam, a metal water bottle to roll her foot on and has bought a night splint.  She is also used a wrap as well.  Nothing seems to be taking the pain away completely.  She states the pain is worse first thing in the morning.    5/31/24: Neela returns today with continued left foot pain.  She states that she is about 30% better today.  She states that she does still continue to take her meloxicam as needed.  She states that she is not as consistent with her stretches that she can be, although she probably only gets these done every other day or so.        The following portions of the patient's history were reviewed and updated as appropriate: allergies, current medications, past family history, past medical history, past social history, past surgical history and problem list.    Review of Systems   Constitutional: Negative.    HENT: Negative.    "  Respiratory: Negative.     Cardiovascular: Negative.    Gastrointestinal: Negative.    Musculoskeletal:  Positive for myalgias.   Skin: Negative.    Neurological: Negative.          OBJECTIVE      /77   Pulse 75   Ht 5' 5\" (1.651 m)   Wt 122 kg (270 lb)   BMI 44.93 kg/m²        Physical Exam  Constitutional:       Appearance: Normal appearance.   HENT:      Head: Normocephalic and atraumatic.   Eyes:      General:         Right eye: No discharge.         Left eye: No discharge.   Cardiovascular:      Rate and Rhythm: Normal rate and regular rhythm.      Pulses:           Dorsalis pedis pulses are 2+ on the right side and 2+ on the left side.        Posterior tibial pulses are 2+ on the right side and 2+ on the left side.   Pulmonary:      Effort: Pulmonary effort is normal.      Breath sounds: Normal breath sounds.   Skin:     General: Skin is warm.      Capillary Refill: Capillary refill takes less than 2 seconds.   Neurological:      Sensory: Sensation is intact. No sensory deficit.         MSK:  -Pain on palpation of medial calcaneal tubercle at the insertion site of the plantar fascia  -no pain with compression of both sides of heel  -No gross deformities noted  -Active range of motion lesser digits intact  -MMT 5/5 to all muscle compartments of the lower extremity  -Ankle dorsiflexion is less than 10 degrees with knee extended, and knee flexed    Derm:  -No lesions, abrasions, or open wounds noted  -No noted interdigital maceration, peeling, malodor  -No areas of callus formation noted on exam  -no erythema, ecchymosis, edema noted     Vascular:  -DP and PT pulses intact b/l  -Capillary refill time <2 sec b/l  -Skin temp: WNL    Neuro:  -Light sensation intact bilaterally  -Protective sensation intact bilaterally  -Tinel sign negative  "

## 2024-07-25 ENCOUNTER — OFFICE VISIT (OUTPATIENT)
Age: 39
End: 2024-07-25
Payer: COMMERCIAL

## 2024-07-25 VITALS — HEIGHT: 65 IN | WEIGHT: 270 LBS | BODY MASS INDEX: 44.98 KG/M2

## 2024-07-25 DIAGNOSIS — M79.672 PAIN OF LEFT HEEL: ICD-10-CM

## 2024-07-25 DIAGNOSIS — M72.2 PLANTAR FASCIITIS, LEFT: Primary | ICD-10-CM

## 2024-07-25 PROCEDURE — 99213 OFFICE O/P EST LOW 20 MIN: CPT | Performed by: STUDENT IN AN ORGANIZED HEALTH CARE EDUCATION/TRAINING PROGRAM

## 2024-07-25 NOTE — PROGRESS NOTES
This patient was seen on  5/31/24 .    My role is Foot , Ankle, and Wound Specialist    ASSESSMENT     Diagnoses and all orders for this visit:    Plantar fasciitis, left  -     XR foot 3+ vw left; Future    Pain of left heel  -     XR foot 3+ vw left; Future             Problem List Items Addressed This Visit          Musculoskeletal and Integument    Plantar fasciitis, left - Primary    Relevant Orders    XR foot 3+ vw left     Other Visit Diagnoses       Pain of left heel        Relevant Orders    XR foot 3+ vw left          PLAN  -Patient was educated regarding their condition.  -Continue night splints, daily stretching program, again reinforced the importance of proper footwear and orthotics  -Patient would like to hold off on physical therapy, advanced imaging, and secondary injection therapy at this time.  -Return to clinic 4 weeks, will consider physical therapy and second injection at this time.    SUBJECTIVE    Chief Complaint:  Left foot pain     Patient ID: Neela Shaikh     3/22/2024: Neela is a pleasant 38-year-old female who presents today with left foot pain.  She states that this been going on for approximately the past 4 weeks.  She states that it is in the heel.  She states that for treatment so far she has gotten different shoes, has used meloxicam, a metal water bottle to roll her foot on and has bought a night splint.  She is also used a wrap as well.  Nothing seems to be taking the pain away completely.  She states the pain is worse first thing in the morning.    7/25/24: Neela returns today with continued left foot pain.  She states that her pain continues to improve, however very slowly.  She states that at worst her pain is about a 2-3/10.  She would like to hold off on physical therapy and second injection at this time.        The following portions of the patient's history were reviewed and updated as appropriate: allergies, current medications, past family history, past medical  "history, past social history, past surgical history and problem list.    Review of Systems   Constitutional: Negative.    HENT: Negative.     Respiratory: Negative.     Cardiovascular: Negative.    Gastrointestinal: Negative.    Musculoskeletal:  Positive for myalgias.   Skin: Negative.    Neurological: Negative.          OBJECTIVE      Ht 5' 5\" (1.651 m)   Wt 122 kg (270 lb)   BMI 44.93 kg/m²        Physical Exam  Constitutional:       Appearance: Normal appearance.   HENT:      Head: Normocephalic and atraumatic.   Eyes:      General:         Right eye: No discharge.         Left eye: No discharge.   Cardiovascular:      Rate and Rhythm: Normal rate and regular rhythm.      Pulses:           Dorsalis pedis pulses are 2+ on the right side and 2+ on the left side.        Posterior tibial pulses are 2+ on the right side and 2+ on the left side.   Pulmonary:      Effort: Pulmonary effort is normal.      Breath sounds: Normal breath sounds.   Skin:     General: Skin is warm.      Capillary Refill: Capillary refill takes less than 2 seconds.   Neurological:      Sensory: Sensation is intact. No sensory deficit.         MSK:  -Pain on palpation of medial calcaneal tubercle at the insertion site of the plantar fascia  -no pain with compression of both sides of heel  -No gross deformities noted  -Active range of motion lesser digits intact  -MMT 5/5 to all muscle compartments of the lower extremity  -Ankle dorsiflexion is less than 10 degrees with knee extended, and knee flexed    Derm:  -No lesions, abrasions, or open wounds noted  -No noted interdigital maceration, peeling, malodor  -No areas of callus formation noted on exam  -no erythema, ecchymosis, edema noted     Vascular:  -DP and PT pulses intact b/l  -Capillary refill time <2 sec b/l  -Skin temp: WNL    Neuro:  -Light sensation intact bilaterally  -Protective sensation intact bilaterally  -Tinel sign negative  "

## 2024-07-26 ENCOUNTER — TELEPHONE (OUTPATIENT)
Age: 39
End: 2024-07-26

## 2024-07-26 DIAGNOSIS — M72.2 PLANTAR FASCIITIS, LEFT: ICD-10-CM

## 2024-07-26 RX ORDER — MELOXICAM 7.5 MG/1
15 TABLET ORAL DAILY
Qty: 30 TABLET | Refills: 0 | Status: SHIPPED | OUTPATIENT
Start: 2024-07-26

## 2024-07-26 NOTE — TELEPHONE ENCOUNTER
Caller: Neela Shaikh    Doctor and/or Office: Dr. Marcelino/[facility]#: [phone]    Escalation: Care Patient has a stiff ankle and is unsure if she should keep wearing the boot? Should she be stretching it? She does not know what she should be doing for ankle. Also, she is unsure where to go for an xray. Is there a place in [place]? Please return call and advise. Thank you

## 2024-07-30 ENCOUNTER — APPOINTMENT (OUTPATIENT)
Dept: RADIOLOGY | Facility: CLINIC | Age: 39
End: 2024-07-30
Payer: COMMERCIAL

## 2024-07-30 DIAGNOSIS — M72.2 PLANTAR FASCIITIS, LEFT: ICD-10-CM

## 2024-07-30 DIAGNOSIS — M79.672 PAIN OF LEFT HEEL: ICD-10-CM

## 2024-07-30 PROCEDURE — 73630 X-RAY EXAM OF FOOT: CPT

## 2024-08-06 ENCOUNTER — TELEPHONE (OUTPATIENT)
Age: 39
End: 2024-08-06

## 2024-08-06 NOTE — TELEPHONE ENCOUNTER
Caller: Neela Shaikh    Doctor and/or Office: Dr. Marcelino/[facility]#: [phone]    Escalation: Care Patient got results in mychart and would like a return call to discuss asap. Thank you

## 2024-08-08 ENCOUNTER — TELEPHONE (OUTPATIENT)
Age: 39
End: 2024-08-08

## 2024-08-20 DIAGNOSIS — M72.2 PLANTAR FASCIITIS, LEFT: ICD-10-CM

## 2024-08-20 RX ORDER — MELOXICAM 7.5 MG/1
15 TABLET ORAL DAILY
Qty: 30 TABLET | Refills: 0 | Status: SHIPPED | OUTPATIENT
Start: 2024-08-20

## 2024-08-26 ENCOUNTER — OFFICE VISIT (OUTPATIENT)
Age: 39
End: 2024-08-26
Payer: COMMERCIAL

## 2024-08-26 VITALS
WEIGHT: 270 LBS | SYSTOLIC BLOOD PRESSURE: 114 MMHG | RESPIRATION RATE: 16 BRPM | HEIGHT: 65 IN | DIASTOLIC BLOOD PRESSURE: 81 MMHG | HEART RATE: 77 BPM | BODY MASS INDEX: 44.98 KG/M2

## 2024-08-26 DIAGNOSIS — M72.2 PLANTAR FASCIITIS, LEFT: Primary | ICD-10-CM

## 2024-08-26 PROCEDURE — 20550 NJX 1 TENDON SHEATH/LIGAMENT: CPT | Performed by: STUDENT IN AN ORGANIZED HEALTH CARE EDUCATION/TRAINING PROGRAM

## 2024-08-26 PROCEDURE — RECHECK: Performed by: STUDENT IN AN ORGANIZED HEALTH CARE EDUCATION/TRAINING PROGRAM

## 2024-08-29 RX ORDER — LIDOCAINE HYDROCHLORIDE 10 MG/ML
1 INJECTION, SOLUTION EPIDURAL; INFILTRATION; INTRACAUDAL; PERINEURAL ONCE
Status: COMPLETED | OUTPATIENT
Start: 2024-08-29 | End: 2024-08-29

## 2024-08-29 RX ORDER — TRIAMCINOLONE ACETONIDE 40 MG/ML
40 INJECTION, SUSPENSION INTRA-ARTICULAR; INTRAMUSCULAR ONCE
Status: COMPLETED | OUTPATIENT
Start: 2024-08-29 | End: 2024-08-29

## 2024-08-29 RX ORDER — DEXAMETHASONE SODIUM PHOSPHATE 4 MG/ML
4 INJECTION, SOLUTION INTRA-ARTICULAR; INTRALESIONAL; INTRAMUSCULAR; INTRAVENOUS; SOFT TISSUE ONCE
Status: COMPLETED | OUTPATIENT
Start: 2024-08-29 | End: 2024-08-29

## 2024-08-29 RX ADMIN — LIDOCAINE HYDROCHLORIDE 1 ML: 10 INJECTION, SOLUTION EPIDURAL; INFILTRATION; INTRACAUDAL; PERINEURAL at 19:27

## 2024-08-29 RX ADMIN — TRIAMCINOLONE ACETONIDE 40 MG: 40 INJECTION, SUSPENSION INTRA-ARTICULAR; INTRAMUSCULAR at 19:27

## 2024-08-29 RX ADMIN — DEXAMETHASONE SODIUM PHOSPHATE 4 MG: 4 INJECTION, SOLUTION INTRA-ARTICULAR; INTRALESIONAL; INTRAMUSCULAR; INTRAVENOUS; SOFT TISSUE at 19:27

## 2024-08-29 NOTE — PROGRESS NOTES
This patient was seen on  8/26/24 .    My role is Foot , Ankle, and Wound Specialist    ASSESSMENT     Diagnoses and all orders for this visit:    Plantar fasciitis, left  -     Ambulatory referral to Physical Therapy; Future  -     lidocaine (PF) (XYLOCAINE-MPF) 1 % injection 1 mL  -     dexamethasone (DECADRON) injection 4 mg  -     triamcinolone acetonide (KENALOG-40) 40 mg/mL injection 40 mg  -     Foot/lower extremity injection             Problem List Items Addressed This Visit          Musculoskeletal and Integument    Plantar fasciitis, left - Primary    Relevant Medications    lidocaine (PF) (XYLOCAINE-MPF) 1 % injection 1 mL (Completed) (Start on 8/29/2024  7:30 PM)    dexamethasone (DECADRON) injection 4 mg (Completed) (Start on 8/29/2024  7:30 PM)    triamcinolone acetonide (KENALOG-40) 40 mg/mL injection 40 mg (Completed) (Start on 8/29/2024  7:30 PM)    Other Relevant Orders    Ambulatory referral to Physical Therapy    Foot/lower extremity injection     PLAN  -Patient was educated regarding their condition.  -Continue night splints, daily stretching program, again reinforced the importance of proper footwear and orthotics  -Corticosteroid injection performed today  -Rx physical therapy  -Return to clinic 8 weeks    Foot/lower extremity injection    Performed by: Ralf Marcelino DPM  Authorized by: Ralf Marcelino DPM    Procedure:     Other Assisting Provider: No      Verbal consent obtained?: Yes      Risks and benefits: Risks, benefits and alternatives were discussed      Consent given by:  Patient    Time out: Immediately prior to the procedure a time out was called      Patient states understanding of procedure being performed: Yes      Site marked: Yes      Patient identity confirmed:  Verbally with patient    Supporting Documentation:     Indications:  Pain    Procedure Details:                Ethyl Chloride was applied      Needle size: 25 G G    Ultrasound Guidance: no      Approach:  Plantar     Laterality:  Left    Cyst Aspiration/Injection: No      Location: aponeurosis      Aponeurosis Structures: Plantar fascia origin      Injection Information:       Patient tolerance:  Patient tolerated the procedure well with no immediate complications    Comments:      Patient's left foot was exposed.  The area of greatest pain was palpated and marked to the left foot.  The foot was cleansed with an alcohol swab and Betadine prep stick.  Next utilizing a 3 cc syringe the foot was injected with 1 mL 1% lidocaine plain, 1 mL dexamethasone 4 mg/mL, 1 mL Kenalog 40.  After the needle was removed the foot was dried and a Band-Aid was placed over the injection site.  The patient tolerated the procedure with no immediate complications.         SUBJECTIVE    Chief Complaint:  Left foot pain     Patient ID: Neela Shaikh     3/22/2024: Neela is a pleasant 38-year-old female who presents today with left foot pain.  She states that this been going on for approximately the past 4 weeks.  She states that it is in the heel.  She states that for treatment so far she has gotten different shoes, has used meloxicam, a metal water bottle to roll her foot on and has bought a night splint.  She is also used a wrap as well.  Nothing seems to be taking the pain away completely.  She states the pain is worse first thing in the morning.    8/25/24: Neela returns today with continued left foot pain.  She states that is worse compared to her previous visit.  She is interested in injection therapy today and will also be willing to perform physical therapy.        The following portions of the patient's history were reviewed and updated as appropriate: allergies, current medications, past family history, past medical history, past social history, past surgical history and problem list.    Review of Systems   Constitutional: Negative.    HENT: Negative.     Respiratory: Negative.     Cardiovascular: Negative.    Gastrointestinal: Negative.   "  Musculoskeletal:  Positive for myalgias.   Skin: Negative.    Neurological: Negative.          OBJECTIVE      /81   Pulse 77   Resp 16   Ht 5' 5\" (1.651 m)   Wt 122 kg (270 lb)   BMI 44.93 kg/m²        Physical Exam  Constitutional:       Appearance: Normal appearance.   HENT:      Head: Normocephalic and atraumatic.   Eyes:      General:         Right eye: No discharge.         Left eye: No discharge.   Cardiovascular:      Rate and Rhythm: Normal rate and regular rhythm.      Pulses:           Dorsalis pedis pulses are 2+ on the right side and 2+ on the left side.        Posterior tibial pulses are 2+ on the right side and 2+ on the left side.   Pulmonary:      Effort: Pulmonary effort is normal.      Breath sounds: Normal breath sounds.   Skin:     General: Skin is warm.      Capillary Refill: Capillary refill takes less than 2 seconds.   Neurological:      Sensory: Sensation is intact. No sensory deficit.         MSK:  -Pain on palpation of medial calcaneal tubercle at the insertion site of the plantar fascia of the left foot  -no pain with compression of both sides of heel  -No gross deformities noted  -Active range of motion lesser digits intact  -MMT 5/5 to all muscle compartments of the lower extremity  -Ankle dorsiflexion is less than 10 degrees with knee extended, and knee flexed    Derm:  -No lesions, abrasions, or open wounds noted  -No noted interdigital maceration, peeling, malodor  -No areas of callus formation noted on exam  -no erythema, ecchymosis, edema noted     Vascular:  -DP and PT pulses intact b/l  -Capillary refill time <2 sec b/l  -Skin temp: WNL    Neuro:  -Light sensation intact bilaterally  -Protective sensation intact bilaterally  -Tinel sign negative to the tibial nerve the left lower extremity  "

## 2024-09-11 DIAGNOSIS — M72.2 PLANTAR FASCIITIS, LEFT: ICD-10-CM

## 2024-09-11 RX ORDER — MELOXICAM 7.5 MG/1
15 TABLET ORAL DAILY
Qty: 30 TABLET | Refills: 0 | Status: SHIPPED | OUTPATIENT
Start: 2024-09-11

## (undated) DEVICE — CHLORAPREP HI-LITE 26ML ORANGE

## (undated) DEVICE — INTENDED FOR TISSUE SEPARATION, AND OTHER PROCEDURES THAT REQUIRE A SHARP SURGICAL BLADE TO PUNCTURE OR CUT.: Brand: BARD-PARKER SAFETY BLADES SIZE 15, STERILE

## (undated) DEVICE — ADHESIVE SKIN HIGH VISCOSITY EXOFIN 1ML

## (undated) DEVICE — PAD GROUNDING ADULT

## (undated) DEVICE — GLOVE SRG BIOGEL ORTHOPEDIC 8

## (undated) DEVICE — SUT VICRYL 3-0 SH 27 IN J416H

## (undated) DEVICE — BETHLEHEM UNIVERSAL MINOR GEN: Brand: CARDINAL HEALTH

## (undated) DEVICE — SUT MONOCRYL 4-0 PS-2 18 IN Y496G

## (undated) DEVICE — PLUMEPEN PRO 10FT

## (undated) DEVICE — NEEDLE 22 G X 1 1/2 SAFETY